# Patient Record
Sex: FEMALE | Race: WHITE
[De-identification: names, ages, dates, MRNs, and addresses within clinical notes are randomized per-mention and may not be internally consistent; named-entity substitution may affect disease eponyms.]

---

## 2017-09-17 ENCOUNTER — HEALTH MAINTENANCE LETTER (OUTPATIENT)
Age: 57
End: 2017-09-17

## 2019-02-06 ENCOUNTER — TELEPHONE (OUTPATIENT)
Dept: FAMILY MEDICINE CLINIC | Age: 59
End: 2019-02-06

## 2019-02-06 ENCOUNTER — OFFICE VISIT (OUTPATIENT)
Dept: FAMILY MEDICINE CLINIC | Age: 59
End: 2019-02-06

## 2019-02-06 VITALS
TEMPERATURE: 98.5 F | SYSTOLIC BLOOD PRESSURE: 120 MMHG | WEIGHT: 155 LBS | BODY MASS INDEX: 25.83 KG/M2 | DIASTOLIC BLOOD PRESSURE: 80 MMHG | HEART RATE: 76 BPM | OXYGEN SATURATION: 98 % | HEIGHT: 65 IN | RESPIRATION RATE: 20 BRPM

## 2019-02-06 DIAGNOSIS — J30.1 SEASONAL ALLERGIC RHINITIS DUE TO POLLEN: ICD-10-CM

## 2019-02-06 DIAGNOSIS — M54.2 CHRONIC NECK PAIN: ICD-10-CM

## 2019-02-06 DIAGNOSIS — H10.13 ALLERGIC CONJUNCTIVITIS OF BOTH EYES: ICD-10-CM

## 2019-02-06 DIAGNOSIS — E03.9 ACQUIRED HYPOTHYROIDISM: ICD-10-CM

## 2019-02-06 DIAGNOSIS — G89.29 CHRONIC NECK PAIN: ICD-10-CM

## 2019-02-06 DIAGNOSIS — F51.01 PRIMARY INSOMNIA: Primary | ICD-10-CM

## 2019-02-06 DIAGNOSIS — K21.9 GASTROESOPHAGEAL REFLUX DISEASE WITHOUT ESOPHAGITIS: ICD-10-CM

## 2019-02-06 DIAGNOSIS — G43.109 MIGRAINE WITH AURA AND WITHOUT STATUS MIGRAINOSUS, NOT INTRACTABLE: ICD-10-CM

## 2019-02-06 RX ORDER — LEVOTHYROXINE SODIUM 88 UG/1
88 TABLET ORAL
Qty: 90 TAB | Refills: 3 | Status: SHIPPED | OUTPATIENT
Start: 2019-02-06 | End: 2019-08-21 | Stop reason: SDUPTHER

## 2019-02-06 RX ORDER — RANITIDINE 300 MG/1
300 TABLET ORAL DAILY
Qty: 90 TAB | Refills: 0 | Status: SHIPPED | OUTPATIENT
Start: 2019-02-06 | End: 2019-05-06 | Stop reason: SDUPTHER

## 2019-02-06 RX ORDER — LEVOTHYROXINE SODIUM 88 UG/1
TABLET ORAL
COMMUNITY
End: 2019-02-06 | Stop reason: SDUPTHER

## 2019-02-06 RX ORDER — ZOLPIDEM TARTRATE 5 MG/1
5 TABLET ORAL
Qty: 20 TAB | Refills: 5 | Status: SHIPPED | OUTPATIENT
Start: 2019-02-06 | End: 2019-08-21 | Stop reason: SDUPTHER

## 2019-02-06 RX ORDER — PSEUDOEPHEDRINE HCL 30 MG
TABLET ORAL
COMMUNITY

## 2019-02-06 RX ORDER — TRAZODONE HYDROCHLORIDE 150 MG/1
150 TABLET ORAL
COMMUNITY
End: 2019-02-06 | Stop reason: SDUPTHER

## 2019-02-06 RX ORDER — RIZATRIPTAN BENZOATE 10 MG/1
10 TABLET ORAL
COMMUNITY
End: 2022-02-28 | Stop reason: DRUGHIGH

## 2019-02-06 RX ORDER — TRAZODONE HYDROCHLORIDE 150 MG/1
150 TABLET ORAL
Qty: 90 TAB | Refills: 3 | Status: SHIPPED | OUTPATIENT
Start: 2019-02-06 | End: 2019-07-20 | Stop reason: SDUPTHER

## 2019-02-06 RX ORDER — NEOMYCIN SULFATE, POLYMYXIN B SULFATE AND HYDROCORTISONE 10; 3.5; 1 MG/ML; MG/ML; [USP'U]/ML
3 SUSPENSION/ DROPS AURICULAR (OTIC) 4 TIMES DAILY
COMMUNITY
End: 2019-02-06 | Stop reason: ALTCHOICE

## 2019-02-06 RX ORDER — PANTOPRAZOLE SODIUM 40 MG/1
40 TABLET, DELAYED RELEASE ORAL DAILY
COMMUNITY
End: 2019-02-06 | Stop reason: SDUPTHER

## 2019-02-06 RX ORDER — BENZONATATE 100 MG/1
100 CAPSULE ORAL
COMMUNITY
End: 2019-02-06 | Stop reason: ALTCHOICE

## 2019-02-06 RX ORDER — SUMATRIPTAN 6 MG/.5ML
6 INJECTION, SOLUTION SUBCUTANEOUS ONCE
COMMUNITY
End: 2022-02-28 | Stop reason: SDUPTHER

## 2019-02-06 RX ORDER — PANTOPRAZOLE SODIUM 40 MG/1
40 TABLET, DELAYED RELEASE ORAL DAILY
Qty: 90 TAB | Refills: 3 | Status: SHIPPED | OUTPATIENT
Start: 2019-02-06 | End: 2020-09-14 | Stop reason: SDUPTHER

## 2019-02-06 RX ORDER — GUAIFENESIN, PSEUDOEPHEDRINE HYDROCHLORIDE 600; 60 MG/1; MG/1
1 TABLET, EXTENDED RELEASE ORAL EVERY 12 HOURS
COMMUNITY

## 2019-02-06 RX ORDER — MECLIZINE HYDROCHLORIDE 25 MG/1
TABLET ORAL
COMMUNITY

## 2019-02-06 RX ORDER — BENZOCAINE .13; .15; .5; 2 G/100G; G/100G; G/100G; G/100G
2 GEL ORAL DAILY
COMMUNITY
Start: 2019-02-06 | End: 2019-05-07 | Stop reason: ALTCHOICE

## 2019-02-06 RX ORDER — FLUTICASONE PROPIONATE AND SALMETEROL 250; 50 UG/1; UG/1
1 POWDER RESPIRATORY (INHALATION) EVERY 12 HOURS
COMMUNITY
End: 2019-02-06 | Stop reason: ALTCHOICE

## 2019-02-06 RX ORDER — AZELASTINE HYDROCHLORIDE 0.5 MG/ML
1 SOLUTION/ DROPS OPHTHALMIC 2 TIMES DAILY
Qty: 6 ML | Refills: 3 | Status: SHIPPED | OUTPATIENT
Start: 2019-02-06 | End: 2019-08-21 | Stop reason: SDUPTHER

## 2019-02-06 RX ORDER — HYDROCODONE BITARTRATE AND ACETAMINOPHEN 5; 325 MG/1; MG/1
TABLET ORAL
COMMUNITY
End: 2020-03-04

## 2019-02-06 RX ORDER — ZOLPIDEM TARTRATE 5 MG/1
TABLET ORAL
COMMUNITY
End: 2019-02-06 | Stop reason: SDUPTHER

## 2019-02-06 RX ORDER — ESTRADIOL AND NORETHINDRONE ACETATE .5; .1 MG/1; MG/1
1 TABLET ORAL DAILY
COMMUNITY

## 2019-02-06 NOTE — PROGRESS NOTES
Assessment/Plan:   
Diagnoses and all orders for this visit: 1. Primary insomnia- UDS will be done through pain management/Dr. Desire Aden as it isn't covered through insurance. Contract signed. [facility] reviewed and is in accordance with patient's prescriptions  
-     zolpidem (AMBIEN) 5 mg tablet; Take 1 Tab by mouth nightly as needed for Sleep. Max Daily Amount: 5 mg. 
-     traZODone (DESYREL) 150 mg tablet; Take 1 Tab by mouth nightly. 2. Migraine with aura and without status migrainosus, not intractable- on aimovig/triptans. Managed by neuro. -     traZODone (DESYREL) 150 mg tablet; Take 1 Tab by mouth nightly. 3. Chronic neck pain 4. Acquired hypothyroidism- 11/2018 labs good. Cont current. -     levothyroxine (SYNTHROID) 88 mcg tablet; Take 1 Tab by mouth Daily (before breakfast). 5. Gastroesophageal reflux disease without esophagitis- failed prilosec. Add zantac. F/u if not controlled and will try aciphex. 
-     raNITIdine (ZANTAC) 300 mg tab; Take 1 Tab by mouth daily. -     pantoprazole (PROTONIX) 40 mg tablet; Take 1 Tab by mouth daily. 6. Seasonal allergic rhinitis due to pollen- trial rhinocort. 7. Allergic conjunctivitis of both eyes- may also be dry eyes due to sudafed. -     azelastine (OPTIVAR) 0.05 % ophthalmic solution; Administer 1 Drop to both eyes two (2) times a day. Use in affected eye(s) The plan was discussed with the patient. The patient verbalized understanding and is in agreement with the plan. All medication potential side effects were discussed with the patient. Health Maintenance:  Try to get colo from GI on [facility] Topic Date Due  
 COLONOSCOPY  09/22/1978  Shingrix Vaccine Age 50> (1 of 2) 09/22/2010  BREAST CANCER SCRN MAMMOGRAM  11/05/2020  PAP AKA CERVICAL CYTOLOGY  09/01/2021  
 DTaP/Tdap/Td series (3 - Td) 12/14/2027  Hepatitis C Screening  Completed  Influenza Age 5 to Adult  Completed Severiano Garrison is a 62 y.o.  female and presents with Establish Care Subjective: 
Pt is here to establish care. Migraine w/ aura- on aimovig, maxalt and imitrex injection. Has failed topamax, elavil. Has been managed by neuro, Demetris Atkins. /Dr. Tata Cotton. Primary Insomnia - on ambien and trazodone. Trazodone was originally prescribed by neuro for migraines. She takes Charleen Haresh on weekdays. Chronic pain - on hydrocodone 7.5/325 #120/90days per pain management. Hypothyroid- on synthroid. TSH 11/2018 good. On HRT- managed by gyn. Reflux - on protonix. Not effectively controlling her sx over past year or so. Takes tums. Tries to watch her diet. Has failed prilosec. She isn't sure if she's tried nexium or aciphex. h pylori testing was neg 2016. Endorses dysphagia. Has had endoscopy previously, but doesn't remember who did it- both were reportedly negative. Had colo- age 48, reportedly neg but doesn't remember who. 
 
allergies -has issues most seasons except winter. Uses sudafed, sinus rinses, tylenol sinus. Can't remember to take nasal sprays. She c/o dry eyes x 1 year. Mild eye itching. Eyes are watery. No blurred vision. Uses visine w/ only partial relief ROS: 
Constitutional: No recent weight change. No weakness/fatigue. No f/c. Skin: No rashes, change in nails/hair, itching HENT: No HA, dizziness. No hearing loss/tinnitus. No nasal congestion/discharge. Eyes: No change in vision, double/blurred vision or eye pain/redness. Cardiovascular: No CP/palpitations. No WILSON/orthopnea/PND. Respiratory: No cough/sputum, dyspnea, wheezing. Gastointestinal: No dysphagia,+ reflux. No n/v. No constipation/diarrhea. No melena/rectal bleeding. Genitourinary: No dysuria, urinary hesitancy, nocturia, hematuria. No incontinence. Musculoskeletal: No joint pain/stiffness. No muscle pain/tenderness. Endo: No heat/cold intolerance, no polyuria/polydypsia. Heme: No h/o anemia. No easy bleeding/bruising. Allergy/Immunology: + seasonal rhinitis. Denies frequent colds, sinus/ear infections. Neurological: No seizures/numbness/weakness. No paresthesias. Psychiatric:  No depression, anxiety. PMH: 
Past Medical History:  
Diagnosis Date  Allergy   
 seasonal  
 GERD (gastroesophageal reflux disease)  Insomnia  Migraine  Thyroid disease  Vertigo PSH: 
Past Surgical History:  
Procedure Laterality Date  HX ABDOMINOPLASTY  HX GYN    
 BTL  
  
 
SH: 
Social History Tobacco Use  Smoking status: Never Smoker  Smokeless tobacco: Never Used Substance Use Topics  Alcohol use: Yes Alcohol/week: 0.6 oz Types: 1 Glasses of wine per week Frequency: 4 or more times a week Drinks per session: 1 or 2 Binge frequency: Never  Drug use: No  
 
 
FH: 
Family History Problem Relation Age of Onset  Heart Attack Father  Other Father Medications/Allergies: 
 
Current Outpatient Medications:  
  pantoprazole (PROTONIX) 40 mg tablet, Take 40 mg by mouth daily. , Disp: , Rfl:  
  levothyroxine (SYNTHROID) 88 mcg tablet, Take  by mouth Daily (before breakfast). , Disp: , Rfl:  
  zolpidem (AMBIEN) 5 mg tablet, Take  by mouth nightly as needed for Sleep., Disp: , Rfl:  
  traZODone (DESYREL) 150 mg tablet, Take 150 mg by mouth nightly., Disp: , Rfl:  
  pseudoephedrine (SUDAFED) 30 mg tablet, Take  by mouth every four (4) hours as needed for Congestion. , Disp: , Rfl:  
  meclizine (ANTIVERT) 25 mg tablet, Take  by mouth three (3) times daily as needed. , Disp: , Rfl:  
  estradiol-norethindrone (ACTIVELLA) 0.5-0.1 mg per tablet, Take 1 Tab by mouth daily. , Disp: , Rfl:  
  SUMAtriptan (IMITREX) 6 mg/0.5 mL injection, 6 mg by SubCUTAneous route once., Disp: , Rfl:  
  rizatriptan (MAXALT) 10 mg tablet, Take 10 mg by mouth once as needed for Migraine.  May repeat in 2 hours if needed, Disp: , Rfl:  
   HYDROcodone-acetaminophen (NORCO) 5-325 mg per tablet, Take  by mouth., Disp: , Rfl:  
  erenumab-aooe (AIMOVIG AUTOINJECTOR SC), by SubCUTAneous route., Disp: , Rfl:  
  PSEUDOEPHEDRINE-guaiFENesin (MUCINEX D)  mg per tablet, Take 1 Tab by mouth every twelve (12) hours. , Disp: , Rfl:  
Allergies Allergen Reactions  Nsaids (Non-Steroidal Anti-Inflammatory Drug) Other (comments) Causes acid reflux  Topamax [Topiramate] Other (comments) Confusion Objective: 
Visit Vitals /80 (BP 1 Location: Left arm, BP Patient Position: Sitting) Pulse 76 Temp 98.5 °F (36.9 °C) (Oral) Resp 20 Ht 5' 5\" (1.651 m) Wt 155 lb (70.3 kg) SpO2 98% BMI 25.79 kg/m² Constitutional: Well developed, nourished, no distress, alert HENT: Exterior ears and tympanic membranes normal bilaterally. Supple neck. No thyromegaly or lymphadenopathy. Oropharynx clear and moist mucous membranes. Eyes: Conjunctiva normal. PERRL. Cardiovascular: S1, S2.  RRR. No murmurs/rubs. No thrills palpated. No carotid bruits. Intact distal pulses. No edema. Pulmonary/Chest Wall: No abnormalities on inspection. Clear to auscultation bilaterally. No wheezing/rhonchi. Normal effort. GI: Soft, nontender, nondistended. Normal active bowel sounds. No  masses on palpation. No hepatosplenomegaly. Musculoskeletal: Gait normal.  Joints without deformity/tenderness. Neurological: Appropriate. No focal motor or sensory deficits. Speech normal.  
Skin: No lesions/rashes on inspection. Psych: Appropriate affect, judgement and insight. Short-term memory intact.

## 2019-02-06 NOTE — PROGRESS NOTES
Malcom Oppenheim is a 62 y.o. female (: 1960) presenting to address: Chief Complaint Patient presents with [place] Establish Care Vitals:  
 19 1250 BP: 120/80 Pulse: 76 Resp: 20 Temp: 98.5 °F (36.9 °C) TempSrc: Oral  
SpO2: 98% Weight: 155 lb (70.3 kg) Height: 5' 5\" (1.651 m) PainSc:   3 PainLoc: Head Hearing/Vision:  
 
 Visual Acuity Screening Right eye Left eye Both eyes Without correction: 20/40 20/30 20/20 With correction:     
 
 
Learning Assessment:  
 
Learning Assessment 2019 PRIMARY LEARNER Patient HIGHEST LEVEL OF EDUCATION - PRIMARY LEARNER  > 4 YEARS OF COLLEGE  
BARRIERS PRIMARY LEARNER NONE  
CO-LEARNER CAREGIVER No  
PRIMARY LANGUAGE ENGLISH  NEED No  
LEARNER PREFERENCE PRIMARY READING  
LEARNING SPECIAL TOPICS none ANSWERED BY patient RELATIONSHIP SELF  
ASSESSMENT COMMENT n/a Depression Screening: PHQ over the last two weeks 2019 PHQ Not Done Medical Reason (indicate in comments) Little interest or pleasure in doing things Not at all Feeling down, depressed, irritable, or hopeless Not at all Total Score PHQ 2 0 Fall Risk Assessment:  
 
Fall Risk Assessment, last 12 mths 2019 Able to walk? Yes Fall in past 12 months? No  
 
Abuse Screening:  
 
Abuse Screening Questionnaire 2019 Do you ever feel afraid of your partner? [id number] Are you in a relationship with someone who physically or mentally threatens you? 3TEN8 Is it safe for you to go home? Seamus Duke Advanced Directive: 1. Do you have an Advanced Directive? NO 
 
2. Would you like information on Advanced Directives?  YES

## 2019-05-06 DIAGNOSIS — K21.9 GASTROESOPHAGEAL REFLUX DISEASE WITHOUT ESOPHAGITIS: ICD-10-CM

## 2019-05-06 RX ORDER — RANITIDINE 300 MG/1
TABLET ORAL
Qty: 90 TAB | Refills: 0 | Status: SHIPPED | OUTPATIENT
Start: 2019-05-06 | End: 2019-07-20 | Stop reason: SDUPTHER

## 2019-05-07 RX ORDER — FLUTICASONE PROPIONATE 50 MCG
2 SPRAY, SUSPENSION (ML) NASAL DAILY
Qty: 3 BOTTLE | Refills: 3 | Status: SHIPPED | OUTPATIENT
Start: 2019-05-07 | End: 2019-08-21 | Stop reason: SINTOL

## 2019-07-20 DIAGNOSIS — G43.109 MIGRAINE WITH AURA AND WITHOUT STATUS MIGRAINOSUS, NOT INTRACTABLE: ICD-10-CM

## 2019-07-20 DIAGNOSIS — F51.01 PRIMARY INSOMNIA: ICD-10-CM

## 2019-07-22 RX ORDER — TRAZODONE HYDROCHLORIDE 150 MG/1
150 TABLET ORAL
Qty: 90 TAB | Refills: 3 | Status: SHIPPED | OUTPATIENT
Start: 2019-07-22 | End: 2020-09-14 | Stop reason: ALTCHOICE

## 2019-08-19 ENCOUNTER — HOSPITAL ENCOUNTER (OUTPATIENT)
Dept: LAB | Age: 59
Discharge: HOME | End: 2019-08-19
Payer: COMMERCIAL

## 2019-08-19 DIAGNOSIS — E03.9 ACQUIRED HYPOTHYROIDISM: Primary | ICD-10-CM

## 2019-08-19 DIAGNOSIS — Z00.00 ROUTINE GENERAL MEDICAL EXAMINATION AT A HEALTH CARE FACILITY: ICD-10-CM

## 2019-08-19 DIAGNOSIS — E03.9 ACQUIRED HYPOTHYROIDISM: ICD-10-CM

## 2019-08-19 LAB
ALBUMIN SERPL-MCNC: 4.3 G/DL (ref 3.4–5)
ALBUMIN/GLOB SERPL: 1.5 {RATIO} (ref 0.8–1.7)
ALP SERPL-CCNC: 54 U/L (ref 45–117)
ALT SERPL-CCNC: 25 U/L (ref 13–56)
ANION GAP SERPL CALC-SCNC: 7 MMOL/L (ref 3–18)
AST SERPL-CCNC: 20 U/L (ref 10–38)
BILIRUB SERPL-MCNC: 0.4 MG/DL (ref 0.2–1)
BUN SERPL-MCNC: 25 MG/DL (ref 7–18)
BUN/CREAT SERPL: 27 (ref 12–20)
CALCIUM SERPL-MCNC: 8.5 MG/DL (ref 8.5–10.1)
CHLORIDE SERPL-SCNC: 107 MMOL/L (ref 100–111)
CHOLEST SERPL-MCNC: 238 MG/DL
CO2 SERPL-SCNC: 27 MMOL/L (ref 21–32)
CREAT SERPL-MCNC: 0.93 MG/DL (ref 0.6–1.3)
ERYTHROCYTE [DISTWIDTH] IN BLOOD BY AUTOMATED COUNT: 13.6 % (ref 11.6–14.5)
GLOBULIN SER CALC-MCNC: 2.9 G/DL (ref 2–4)
GLUCOSE SERPL-MCNC: 96 MG/DL (ref 74–99)
HCT VFR BLD AUTO: 40.9 % (ref 35–45)
HDLC SERPL-MCNC: 60 MG/DL (ref 40–60)
HDLC SERPL: 4 {RATIO} (ref 0–5)
HGB BLD-MCNC: 13.3 G/DL (ref 12–16)
LDLC SERPL CALC-MCNC: 144.8 MG/DL (ref 0–100)
LIPID PROFILE,FLP: ABNORMAL
MCH RBC QN AUTO: 31.4 PG (ref 24–34)
MCHC RBC AUTO-ENTMCNC: 32.5 G/DL (ref 31–37)
MCV RBC AUTO: 96.7 FL (ref 74–97)
PLATELET # BLD AUTO: 278 K/UL (ref 135–420)
PMV BLD AUTO: 10.8 FL (ref 9.2–11.8)
POTASSIUM SERPL-SCNC: 4.3 MMOL/L (ref 3.5–5.5)
PROT SERPL-MCNC: 7.2 G/DL (ref 6.4–8.2)
RBC # BLD AUTO: 4.23 M/UL (ref 4.2–5.3)
SODIUM SERPL-SCNC: 141 MMOL/L (ref 136–145)
TRIGL SERPL-MCNC: 166 MG/DL (ref ?–150)
TSH SERPL DL<=0.05 MIU/L-ACNC: 1.2 UIU/ML (ref 0.36–3.74)
VLDLC SERPL CALC-MCNC: 33.2 MG/DL
WBC # BLD AUTO: 5.4 K/UL (ref 4.6–13.2)

## 2019-08-19 PROCEDURE — 85027 COMPLETE CBC AUTOMATED: CPT

## 2019-08-19 PROCEDURE — 84443 ASSAY THYROID STIM HORMONE: CPT

## 2019-08-19 PROCEDURE — 80061 LIPID PANEL: CPT

## 2019-08-19 PROCEDURE — 80053 COMPREHEN METABOLIC PANEL: CPT

## 2019-08-19 PROCEDURE — 36415 COLL VENOUS BLD VENIPUNCTURE: CPT

## 2019-08-21 ENCOUNTER — OFFICE VISIT (OUTPATIENT)
Dept: FAMILY MEDICINE CLINIC | Age: 59
End: 2019-08-21

## 2019-08-21 ENCOUNTER — TELEPHONE (OUTPATIENT)
Dept: FAMILY MEDICINE CLINIC | Age: 59
End: 2019-08-21

## 2019-08-21 VITALS
OXYGEN SATURATION: 98 % | RESPIRATION RATE: 20 BRPM | BODY MASS INDEX: 27.49 KG/M2 | DIASTOLIC BLOOD PRESSURE: 75 MMHG | HEART RATE: 73 BPM | HEIGHT: 65 IN | SYSTOLIC BLOOD PRESSURE: 120 MMHG | WEIGHT: 165 LBS | TEMPERATURE: 97.8 F

## 2019-08-21 DIAGNOSIS — E03.9 ACQUIRED HYPOTHYROIDISM: ICD-10-CM

## 2019-08-21 DIAGNOSIS — E78.00 PURE HYPERCHOLESTEROLEMIA: ICD-10-CM

## 2019-08-21 DIAGNOSIS — Z00.00 ROUTINE GENERAL MEDICAL EXAMINATION AT A HEALTH CARE FACILITY: Primary | ICD-10-CM

## 2019-08-21 DIAGNOSIS — H10.13 ALLERGIC CONJUNCTIVITIS OF BOTH EYES: ICD-10-CM

## 2019-08-21 DIAGNOSIS — F51.01 PRIMARY INSOMNIA: ICD-10-CM

## 2019-08-21 DIAGNOSIS — Z23 ENCOUNTER FOR IMMUNIZATION: ICD-10-CM

## 2019-08-21 RX ORDER — ZOLPIDEM TARTRATE 5 MG/1
5 TABLET ORAL
Qty: 20 TAB | Refills: 5 | Status: SHIPPED | OUTPATIENT
Start: 2019-08-21 | End: 2020-02-06 | Stop reason: SDUPTHER

## 2019-08-21 RX ORDER — AZELASTINE HYDROCHLORIDE 0.5 MG/ML
1 SOLUTION/ DROPS OPHTHALMIC 2 TIMES DAILY
Qty: 6 ML | Refills: 3 | Status: SHIPPED | OUTPATIENT
Start: 2019-08-21 | End: 2019-09-17 | Stop reason: SDUPTHER

## 2019-08-21 RX ORDER — SERTRALINE HYDROCHLORIDE 25 MG/1
TABLET, FILM COATED ORAL DAILY
COMMUNITY
End: 2020-09-14 | Stop reason: SDUPTHER

## 2019-08-21 RX ORDER — LEVOTHYROXINE SODIUM 88 UG/1
88 TABLET ORAL
Qty: 90 TAB | Refills: 3 | Status: SHIPPED | OUTPATIENT
Start: 2019-08-21 | End: 2020-09-14 | Stop reason: SDUPTHER

## 2019-08-21 NOTE — PROGRESS NOTES
Assessment/Plan:    1. Routine general medical examination at a health care facility  -labs good    2. Primary insomnia  -refilled  - zolpidem (AMBIEN) 5 mg tablet; Take 1 Tab by mouth nightly as needed for Sleep. Max Daily Amount: 5 mg. Dispense: 20 Tab; Refill: 5    3. Pure hypercholesterolemia  -watch diet    4. Allergic conjunctivitis of both eyes  -refilled  - azelastine (OPTIVAR) 0.05 % ophthalmic solution; Administer 1 Drop to both eyes two (2) times a day. Use in affected eye(s)  Dispense: 6 mL; Refill: 3    5. Acquired hypothyroidism  -refilled  - levothyroxine (SYNTHROID) 88 mcg tablet; Take 1 Tab by mouth Daily (before breakfast). Dispense: 90 Tab; Refill: 3    6. Encounter for immunization  - ZOSTER VACC RECOMBINANT ADJUVANTED  - VT IMMUNIZ ADMIN,1 SINGLE/COMB VAC/TOXOID      The plan was discussed with the patient. The patient verbalized understanding and is in agreement with the plan. All medication potential side effects were discussed with the patient. Health Maintenance:   Health Maintenance   Topic Date Due    Shingrix Vaccine Age 49> (1 of 2) 09/22/2010    Influenza Age 5 to Adult  08/01/2019    BREAST CANCER SCRN MAMMOGRAM  11/05/2020    COLONOSCOPY  01/24/2021    PAP AKA CERVICAL CYTOLOGY  09/01/2021    DTaP/Tdap/Td series (3 - Td) 12/14/2027    Hepatitis C Screening  Completed    Pneumococcal 0-64 years  Aged Out       David Littlejohn is a 62 y.o. female and presents with Physical     Subjective:  Pt c/o \"arthritis\". States she feels stiff in her hips and back. Am stiffness and gelling lasts only seconds. No redness/swelling/warmth of joints. Has tried glucosamine which helps, but it makes her gerd worse. Hypothyroid- labs were good. Insomnia - on ambien. UDS done through neuro. ROS:  Constitutional: No recent weight change. No weakness/fatigue. No f/c. Skin: No rashes, change in nails/hair, itching   HENT: No HA, dizziness. No hearing loss/tinnitus.   No nasal congestion/discharge. Eyes: No change in vision, double/blurred vision or eye pain/redness. Cardiovascular: No CP/palpitations. No WILSON/orthopnea/PND. Respiratory: No cough/sputum, dyspnea, wheezing. Gastointestinal: No dysphagia, reflux. No n/v. No constipation/diarrhea. No melena/rectal bleeding. Genitourinary: No dysuria, urinary hesitancy, nocturia, hematuria. No incontinence. Musculoskeletal: No joint pain/stiffness. No muscle pain/tenderness. Endo: No heat/cold intolerance, no polyuria/polydypsia. Heme: No h/o anemia. No easy bleeding/bruising. Allergy/Immunology: No seasonal rhinitis. Denies frequent colds, sinus/ear infections. Neurological: No seizures/numbness/weakness. No paresthesias. Psychiatric:  No depression, anxiety. The problem list was updated as a part of today's visit. Patient Active Problem List   Diagnosis Code    Migraine with aura and without status migrainosus, not intractable G43. 109    Seasonal allergic rhinitis due to pollen J30.1    Primary insomnia F51.01    Chronic neck pain M54.2, G89.29    Acquired hypothyroidism E03.9    Gastroesophageal reflux disease without esophagitis K21.9       The PSH, FH were reviewed. SH:  Social History     Tobacco Use    Smoking status: Never Smoker    Smokeless tobacco: Never Used   Substance Use Topics    Alcohol use: Yes     Alcohol/week: 1.0 standard drinks     Types: 1 Glasses of wine per week     Frequency: 4 or more times a week     Drinks per session: 1 or 2     Binge frequency: Never    Drug use: No       Medications/Allergies:  Current Outpatient Medications on File Prior to Visit   Medication Sig Dispense Refill    raNITIdine (ZANTAC) 300 mg tab TAKE 1 TABLET BY MOUTH EVERY DAY 90 Tab 3    traZODone (DESYREL) 150 mg tablet Take 1 Tab by mouth nightly. 90 Tab 3    fluticasone propionate (FLONASE) 50 mcg/actuation nasal spray 2 Sprays by Both Nostrils route daily.  3 Bottle 3    pseudoephedrine (SUDAFED) 30 mg tablet Take  by mouth every four (4) hours as needed for Congestion.  meclizine (ANTIVERT) 25 mg tablet Take  by mouth three (3) times daily as needed.  estradiol-norethindrone (ACTIVELLA) 0.5-0.1 mg per tablet Take 1 Tab by mouth daily.  SUMAtriptan (IMITREX) 6 mg/0.5 mL injection 6 mg by SubCUTAneous route once.  rizatriptan (MAXALT) 10 mg tablet Take 10 mg by mouth once as needed for Migraine. May repeat in 2 hours if needed      HYDROcodone-acetaminophen (NORCO) 5-325 mg per tablet Take  by mouth.  erenumab-aooe (AIMOVIG AUTOINJECTOR SC) by SubCUTAneous route.  PSEUDOEPHEDRINE-guaiFENesin (MUCINEX D)  mg per tablet Take 1 Tab by mouth every twelve (12) hours.  pantoprazole (PROTONIX) 40 mg tablet Take 1 Tab by mouth daily. 90 Tab 3    levothyroxine (SYNTHROID) 88 mcg tablet Take 1 Tab by mouth Daily (before breakfast). 90 Tab 3    zolpidem (AMBIEN) 5 mg tablet Take 1 Tab by mouth nightly as needed for Sleep. Max Daily Amount: 5 mg. 20 Tab 5    azelastine (OPTIVAR) 0.05 % ophthalmic solution Administer 1 Drop to both eyes two (2) times a day. Use in affected eye(s) 6 mL 3     No current facility-administered medications on file prior to visit. Allergies   Allergen Reactions    Nsaids (Non-Steroidal Anti-Inflammatory Drug) Other (comments)     Causes acid reflux    Topamax [Topiramate] Other (comments)     Confusion         Objective:  Visit Vitals  /75 (BP 1 Location: Left arm, BP Patient Position: Sitting)   Pulse 73   Temp 97.8 °F (36.6 °C) (Oral)   Resp 20   Ht 5' 5\" (1.651 m)   Wt 165 lb (74.8 kg)   SpO2 98%   BMI 27.46 kg/m²      Constitutional: Well developed, nourished, no distress, alert   HENT: Exterior ears and tympanic membranes normal bilaterally. Supple neck. No thyromegaly or lymphadenopathy. Oropharynx clear and moist mucous membranes. Normal inferior turbinates. Septum midline. Eyes: Conjunctiva normal. PERRL. Eyelids normal.   CV: S1, S2.  RRR. No murmurs/rubs. No edema. Pulm: No abnormalities on inspection. Clear to auscultation bilaterally. No wheezing/rhonchi. Normal effort. GI: Soft, nontender, nondistended. Normal active bowel sounds. MS: Gait normal.  Joints without deformity/tenderness. Strength intact bilateral upper and lower ext. Normal ROM all extremities. Neuro: A/O x 3. No focal motor or sensory deficits. Speech normal.   Skin: No lesions/rashes on inspection. Psych: Appropriate affect, judgement and insight. Short-term memory intact. Labwork and Ancillary Studies:    CBC w/Diff  Lab Results   Component Value Date/Time    WBC 5.4 08/19/2019 08:27 AM    HGB 13.3 08/19/2019 08:27 AM    PLATELET 165 32/00/1315 08:27 AM         Basic Metabolic Profile/LFTs  Lab Results   Component Value Date/Time    Sodium 141 08/19/2019 08:27 AM    Potassium 4.3 08/19/2019 08:27 AM    Chloride 107 08/19/2019 08:27 AM    CO2 27 08/19/2019 08:27 AM    Anion gap 7 08/19/2019 08:27 AM    Glucose 96 08/19/2019 08:27 AM    BUN 25 (H) 08/19/2019 08:27 AM    Creatinine 0.93 08/19/2019 08:27 AM    BUN/Creatinine ratio 27 (H) 08/19/2019 08:27 AM    GFR est AA >60 08/19/2019 08:27 AM    GFR est non-AA >60 08/19/2019 08:27 AM    Calcium 8.5 08/19/2019 08:27 AM      Lab Results   Component Value Date/Time    ALT (SGPT) 25 08/19/2019 08:27 AM    AST (SGOT) 20 08/19/2019 08:27 AM    Alk.  phosphatase 54 08/19/2019 08:27 AM    Bilirubin, total 0.4 08/19/2019 08:27 AM       Cholesterol  Lab Results   Component Value Date/Time    Cholesterol, total 238 (H) 08/19/2019 08:27 AM    HDL Cholesterol 60 08/19/2019 08:27 AM    LDL, calculated 144.8 (H) 08/19/2019 08:27 AM    Triglyceride 166 (H) 08/19/2019 08:27 AM    CHOL/HDL Ratio 4.0 08/19/2019 08:27 AM

## 2019-08-21 NOTE — PROGRESS NOTES
shingrix # 1 Immunization/s administered 8/21/2019 by Radha Phelan LPN with guardian's consent. Patient tolerated procedure well. No reactions noted.

## 2019-08-21 NOTE — PROGRESS NOTES
Aissatou Vegas is a 62 y.o. female (: 1960) presenting to address:    Chief Complaint   Patient presents with    Physical       Vitals:    19 1025   BP: 120/75   Pulse: 73   Resp: 20   Temp: 97.8 °F (36.6 °C)   TempSrc: Oral   SpO2: 98%   Weight: 165 lb (74.8 kg)   Height: 5' 5\" (1.651 m)   PainSc:   2   PainLoc: Generalized       Hearing/Vision:      Visual Acuity Screening    Right eye Left eye Both eyes   Without correction: 20/50 20/25 20/25   With correction:          Learning Assessment:     Learning Assessment 2019   PRIMARY LEARNER Patient   HIGHEST LEVEL OF EDUCATION - PRIMARY LEARNER  > 4 YEARS OF COLLEGE   BARRIERS PRIMARY LEARNER NONE   CO-LEARNER CAREGIVER No   PRIMARY LANGUAGE ENGLISH    NEED No   LEARNER PREFERENCE PRIMARY READING   LEARNING SPECIAL TOPICS none   ANSWERED BY patient   RELATIONSHIP SELF   ASSESSMENT COMMENT n/a     Depression Screening:     3 most recent PHQ Screens 2019   PHQ Not Done Active Diagnosis of Depression or Bipolar Disorder   Little interest or pleasure in doing things -   Feeling down, depressed, irritable, or hopeless -   Total Score PHQ 2 -     Fall Risk Assessment:     Fall Risk Assessment, last 12 mths 2019   Able to walk? Yes   Fall in past 12 months? Yes   Fall with injury? Yes   Number of falls in past 12 months 1   Fall Risk Score 2     Abuse Screening:     Abuse Screening Questionnaire 2019   Do you ever feel afraid of your partner? N   Are you in a relationship with someone who physically or mentally threatens you? N   Is it safe for you to go home? Y     Coordination of Care Questionaire:   1. Have you been to the ER, urgent care clinic since your last visit? Hospitalized since your last visit? NO    2. Have you seen or consulted any other health care providers outside of the [address] [place] [address] since your last visit? Include any pap smears or colon screening. YES- psychiatry    Advanced Directive:   1.  Do you have an Advanced Directive? NO    2. Would you like information on Advanced Directives?  YES

## 2019-08-21 NOTE — PATIENT INSTRUCTIONS
Vaccine Information Statement    Recombinant Zoster (Shingles) Vaccine, RZV: What you need to know     Many Vaccine Information Statements are available in Turkmen and other languages. See www.immunize.org/vis  Hojas de Información Sobre Vacunas están disponibles en Español y en muchos otros idiomas. Visite [email or web address]    1. Why get vaccinated? Shingles (also called herpes zoster, or just zoster) is a painful skin rash, often with blisters. Shingles is caused by the varicella zoster virus, the same virus that causes chickenpox. After you have chickenpox, the virus stays in your body and can cause shingles later in life. You cant catch shingles from another person. However, a person who has never had chickenpox (or chickenpox vaccine) could get chickenpox from someone with shingles. A shingles rash usually appears on one side of the face or body and heals within 2 to 4 weeks. Its main symptom is pain, which can be severe. Other symptoms can include fever, headache, chills, and upset stomach. Very rarely, a shingles infection can lead to pneumonia, hearing problems, blindness, brain inflammation (encephalitis), or death. For about 1 person in 5, severe pain can continue even long after the rash has cleared up. This long-lasting pain is called post-herpetic neuralgia (PHN). Shingles is far more common in people 48years of age and older than in younger people, and the risk increases with age. It is also more common in people whose immune system is weakened because of a disease such as cancer, or by drugs such as steroids or chemotherapy. At least 1 million people a year in the [facility] get shingles. 2. Shingles vaccine (recombinant)    Recombinant shingles vaccine was approved by FDA in 2017 for the prevention of shingles. In clinical trials, it was more than 90% effective in preventing shingles. It can also reduce the likelihood of PHN.     Two doses, 2 to 6 months apart, are recommended for adults 48 and older. This vaccine is also recommended for people who have already gotten the live shingles vaccine (Zostavax). There is no live virus in this vaccine. 3. Some people should not get this vaccine    Tell your vaccine provider if you:     Have any severe, life-threatening allergies. A person who has ever had a life-threatening allergic reaction after a dose of recombinant shingles vaccine, or has a severe allergy to any component of this vaccine, may be advised not to be vaccinated. Ask your health care provider if you want information about vaccine components.  Are pregnant or breastfeeding. There is not much information about use of recombinant shingles vaccine in pregnant or nursing women. Your healthcare provider might recommend delaying vaccination.  Are not feeling well. If you have a mild illness, such as a cold, you can probably get the vaccine today. If you are moderately or severely ill, you should probably wait until you recover. Your doctor can advise you. 4. Risks of a vaccine reaction    With any medicine, including vaccines, there is a chance of reactions. After recombinant shingles vaccination, a person might experience:    Pain, redness, soreness, or swelling at the site of the injection   Headache, muscle aches, fever, shivering, fatigue    In clinical trials, most people got a sore arm with mild or moderate pain after vaccination, and some also had redness and swelling where they got the shot. Some people felt tired, had muscle pain, a headache, shivering, fever, stomach pain, or nausea. About 1 out of 6 people who got recombinant zoster vaccine experienced side effects that prevented them from doing regular activities. Symptoms went away on their own in about 2 to 3 days. Side effects were more common in younger people.     You should still get the second dose of recombinant zoster vaccine even if you had one of these reactions after the first dose. Other things that could happen after this vaccine:     People sometimes faint after medical procedures, including vaccination. Sitting or lying down for about 15 minutes can help prevent fainting and injuries caused by a fall. Tell your provider if you feel dizzy or have vision changes or ringing in the ears.  Some people get shoulder pain that can be more severe and longer-lasting than routine soreness that can follow injections. This happens very rarely.  Any medication can cause a severe allergic reaction. Such reactions to a vaccine are estimated at about 1 in a million doses, and would happen within a few minutes to a few hours after the vaccination. As with any medicine, there is a very remote chance of a vaccine causing a serious injury or death. The safety of vaccines is always being monitored. For more information, visit: www.cdc.gov/vaccinesafety/     5. What if there is a serious problem? What should I look for?  Look for anything that concerns you, such as signs of a severe allergic reaction, very high fever, or unusual behavior. Signs of a severe allergic reaction can include hives, swelling of the face and throat, difficulty breathing, a fast heartbeat, dizziness, and weakness. These would usually start a few minutes to a few hours after the vaccination. What should I do?  If you think it is a severe allergic reaction or other emergency that cant wait, call 9-1-1 or get to the nearest hospital. Otherwise, call your health care provider. Afterward, the reaction should be reported to the Vaccine Adverse Event Reporting System (VAERS). Your doctor should file this report, or you can do it yourself through the VAERS website at www.vaers. hhs.gov, or by calling 4-616.663.9280. VAERS does not give medical advice. 6. How can I learn more?  Ask your health care provider.  He or she can give you the vaccine package insert or suggest other sources of information.  Call your local or state health department.  Contact the Centers for Disease Control and Prevention (CDC):  - Call 4-618.310.9488 (1-800-CDC-INFO) or  - Visit CDCs website at www.cdc.gov/vaccines    Vaccine Information Statement  Recombinant Zoster Vaccine   2/12/2018    [facility] Disease Control and Prevention    Office Use Only       Hand Arthritis: Exercises  Introduction  Here are some examples of exercises for you to try. The exercises may be suggested for a condition or for rehabilitation. Start each exercise slowly. Ease off the exercises if you start to have pain. You will be told when to start these exercises and which ones will work best for you. How to do the exercises  Tendon glides    1. In this exercise, the steps follow one another to a make a continuous movement. 2. With your affected hand, point your fingers and thumb straight up. Your wrist should be relaxed, following the line of your fingers and thumb. 3. Curl your fingers so that the top two joints in them are bent, and your fingers wrap down. Your fingertips should touch or be near the base of your fingers. Your fingers will look like a hook. 4. Make a fist by bending your knuckles. Your thumb can gently rest against your index (pointing) finger. 5. Unwind your fingers slightly so that your fingertips can touch the base of your palm. Your thumb can rest against your index finger. 6. Move back to your starting position, with your fingers and thumb pointing up. 7. Repeat the series of motions 8 to 12 times. 8. Switch hands and repeat steps 1 through 6, even if only one hand is sore. Intrinsic flexion    1. Rest your affected hand on a table and bend the large joints where your fingers connect to your hand. Keep your thumb and the other joints in your fingers straight. 2. Slowly straighten your fingers.  Your wrist should be relaxed, following the line of your fingers and thumb.  3. Move back to your starting position, with your hand bent. 4. Repeat 8 to 12 times. 5. Switch hands and repeat steps 1 through 4, even if only one hand is sore. Finger extension    1. Place your affected hand flat on a table. 2. Lift and then lower one finger at a time off the table. 3. Repeat 8 to 12 times. 4. Switch hands and repeat steps 1 through 3, even if only one hand is sore. MP extension    1. Place your good hand on a table, palm up. Put your affected hand on top of your good hand with your fingers wrapped around the thumb of your good hand like you are making a fist.  2. Slowly uncurl the joints of your affected hand where your fingers connect to your hand so that only the top two joints of your fingers are bent. Your fingers will look like a hook. 3. Move back to your starting position, with your fingers wrapped around your good thumb. 4. Repeat 8 to 12 times. 5. Switch hands and repeat steps 1 through 4, even if only one hand is sore. PIP extension (with MP extension)    1. Place your good hand on a table, palm up. Put your affected hand on top of your good hand, palm up. 2. Use the thumb and fingers of your good hand to grasp below the middle joint of one finger of your affected hand. 3. Straighten the last two joints of that finger. 4. Repeat 8 to 12 times. 5. Repeat steps 1 through 4 with each finger. 6. Switch hands and repeat steps 1 through 5, even if only one hand is sore. DIP flexion    1. With your good hand, grasp one finger of your affected hand. Your thumb will be on the top side of your finger just below the joint that is closest to your fingernail. 2. Slowly bend your affected finger only at the joint closest to your fingernail. 3. Repeat 8 to 12 times. 4. Repeat steps 1 through 3 with each finger. 5. Switch hands and repeat steps 1 through 4, even if only one hand is sore. Follow-up care is a key part of your treatment and safety.  Be sure to make and go to all appointments, and call your doctor if you are having problems. It's also a good idea to know your test results and keep a list of the medicines you take. Where can you learn more? Go to http://neftali-jason.info/. Enter M258 in the search box to learn more about \"Hand Arthritis: Exercises. \"  Current as of: September 20, 2018  Content Version: 12.1  © 9478-4655 [employer]. Care instructions adapted under license by [employer] (which disclaims liability or warranty for this information). If you have questions about a medical condition or this instruction, always ask your healthcare professional. [address] any warranty or liability for your use of this information. Low Back Arthritis: Exercises  Introduction  Here are some examples of typical rehabilitation exercises for your condition. Start each exercise slowly. Ease off the exercise if you start to have pain. Your doctor or physical therapist will tell you when you can start these exercises and which ones will work best for you. When you are not being active, find a comfortable position for rest. Some people are comfortable on the floor or a medium-firm bed with a small pillow under their head and another under their knees. Some people prefer to lie on their side with a pillow between their knees. Don't stay in one position for too long. Take short walks (10 to 20 minutes) every 2 to 3 hours. Avoid slopes, hills, and stairs until you feel better. Walk only distances you can manage without pain, especially leg pain. How to do the exercises  Pelvic tilt    9. Lie on your back with your knees bent. 10. \"Brace\" your stomachtighten your muscles by pulling in and imagining your belly button moving toward your spine. 11. Press your lower back into the floor. You should feel your hips and pelvis rock back. 12. Hold for 6 seconds while breathing smoothly.   13. Relax and allow your pelvis and hips to rock forward. 14. Repeat 8 to 12 times. Back stretches    6. Get down on your hands and knees on the floor. 7. Relax your head and allow it to droop. Round your back up toward the ceiling until you feel a nice stretch in your upper, middle, and lower back. Hold this stretch for as long as it feels comfortable, or about 15 to 30 seconds. 8. Return to the starting position with a flat back while you are on your hands and knees. 9. Let your back sway by pressing your stomach toward the floor. Lift your buttocks toward the ceiling. 10. Hold this position for 15 to 30 seconds. 11. Repeat 2 to 4 times. Follow-up care is a key part of your treatment and safety. Be sure to make and go to all appointments, and call your doctor if you are having problems. It's also a good idea to know your test results and keep a list of the medicines you take. Where can you learn more? Go to http://neftali-jason.info/. Enter M858 in the search box to learn more about \"Low Back Arthritis: Exercises. \"  Current as of: September 20, 2018  Content Version: 12.1  © 7307-5367 [employer]. Care instructions adapted under license by [employer] (which disclaims liability or warranty for this information). If you have questions about a medical condition or this instruction, always ask your healthcare professional. [place] [address] any warranty or liability for your use of this information. Hip Arthritis: Exercises  Introduction  Here are some examples of exercises for you to try. The exercises may be suggested for a condition or for rehabilitation. Start each exercise slowly. Ease off the exercises if you start to have pain. You will be told when to start these exercises and which ones will work best for you. How to do the exercises  Straight-leg raises to the outside    15. Lie on your side, with your affected hip on top.   16. Tighten the front thigh muscles of your top leg to keep your knee straight. 16. Keep your hip and your leg straight in line with the rest of your body, and keep your knee pointing forward. Do not drop your hip back. 18. Lift your top leg straight up toward the ceiling, about 12 inches off the floor. Hold for about 6 seconds, then slowly lower your leg. 19. Repeat 8 to 12 times. 20. Switch legs and repeat steps 1 through 5, even if only one hip is sore. Straight-leg raises to the inside    12. Lie on your side with your affected hip on the floor. 13. You can either prop up your other leg on a chair, or you can bend that knee and put that foot in front of your other knee. Do not drop your hip back. 14. Tighten the muscles on the front thigh of your bottom leg to straighten that knee. 15. Keep your kneecap pointing forward and your leg straight, and lift your bottom leg up toward the ceiling about 6 inches. Hold for about 6 seconds, then lower slowly. 16. Repeat 8 to 12 times. 17. Switch legs and repeat steps 1 through 5, even if only one hip is sore. Hip hike    5. Stand sideways on the bottom step of a staircase, and hold on to the banister or wall. 6. Keeping both knees straight, lift your good leg off the step and let it hang down. Then hike your good hip up to the same level as your affected hip or a little higher. 7. Repeat 8 to 12 times. 8. Switch legs and repeat steps 1 through 3, even if only one hip is sore. Bridging    6. Lie on your back with both knees bent. Your knees should be bent about 90 degrees. 7. Then push your feet into the floor, squeeze your buttocks, and lift your hips off the floor until your shoulders, hips, and knees are all in a straight line. 8. Hold for about 6 seconds as you continue to breathe normally, and then slowly lower your hips back down to the floor and rest for up to 10 seconds. 9. Repeat 8 to 12 times. Hamstring stretch (lying down)    7.  Lie flat on your back with your legs straight. If you feel discomfort in your back, place a small towel roll under your lower back. 8. Holding the back of your affected leg, lift your leg straight up and toward your body until you feel a stretch at the back of your thigh. 9. Hold the stretch for at least 30 seconds. 10. Repeat 2 to 4 times. 11. Switch legs and repeat steps 1 through 4, even if only one hip is sore. Standing quadriceps stretch    6. If you are not steady on your feet, hold on to a chair, counter, or wall. You can also lie on your stomach or your side to do this exercise. 7. Bend the knee of the leg you want to stretch, and reach behind you to grab the front of your foot or ankle with the hand on the same side. For example, if you are stretching your right leg, use your right hand. 8. Keeping your knees next to each other, pull your foot toward your buttock until you feel a gentle stretch across the front of your hip and down the front of your thigh. Your knee should be pointed directly to the ground, and not out to the side. 9. Hold the stretch for at least 15 to 30 seconds. 10. Repeat 2 to 4 times. 11. Switch legs and repeat steps 1 through 5, even if only one hip is sore. Hip rotator stretch    1. Lie on your back with both knees bent and your feet flat on the floor. 2. Put the ankle of your affected leg on your opposite thigh near your knee. 3. Use your hand to gently push your knee away from your body until you feel a gentle stretch around your hip. 4. Hold the stretch for 15 to 30 seconds. 5. Repeat 2 to 4 times. 6. Repeat steps 1 through 5, but this time use your hand to gently pull your knee toward your opposite shoulder. 7. Switch legs and repeat steps 1 through 6, even if only one hip is sore. Knee-to-chest    1. Lie on your back with your knees bent and your feet flat on the floor.   2. Bring your affected leg to your chest, keeping the other foot flat on the floor (or keeping the other leg straight, whichever feels better on your lower back). 3. Keep your lower back pressed to the floor. Hold for at least 15 to 30 seconds. 4. Relax, and lower the knee to the starting position. 5. Repeat 2 to 4 times. 6. Switch legs and repeat steps 1 through 5, even if only one hip is sore. 7. To get more stretch, put your other leg flat on the floor while pulling your knee to your chest.    Clamshell    1. Lie on your side, with your affected hip on top. Keep your feet and knees together and your knees bent. 2. Raise your top knee, but keep your feet together. Do not let your hips roll back. Your legs should open up like a clamshell. 3. Hold for 6 seconds. 4. Slowly lower your knee back down. Rest for 10 seconds. 5. Repeat 8 to 12 times. 6. Switch legs and repeat steps 1 through 5, even if only one hip is sore. Follow-up care is a key part of your treatment and safety. Be sure to make and go to all appointments, and call your doctor if you are having problems. It's also a good idea to know your test results and keep a list of the medicines you take. Where can you learn more? Go to http://neftali-jason.info/. Enter K909 in the search box to learn more about \"Hip Arthritis: Exercises. \"  Current as of: September 20, 2018  Content Version: 12.1  © 9211-6418 [employer]. Care instructions adapted under license by [employer] (which disclaims liability or warranty for this information). If you have questions about a medical condition or this instruction, always ask your healthcare professional. [place] [address] any warranty or liability for your use of this information.

## 2019-08-26 NOTE — TELEPHONE ENCOUNTER
Please tell pt we spoke with Dr. Malcolm Mccallum office. She is overdue for colo and she needs to schedule.

## 2019-08-26 NOTE — TELEPHONE ENCOUNTER
Called pt, reached self identified vm. Left msg that we confirmed with 's office that she is overdue for colo and should schedule.

## 2019-09-16 DIAGNOSIS — H10.13 ALLERGIC CONJUNCTIVITIS OF BOTH EYES: ICD-10-CM

## 2019-09-17 RX ORDER — AZELASTINE HYDROCHLORIDE 0.5 MG/ML
1 SOLUTION/ DROPS OPHTHALMIC 2 TIMES DAILY
Qty: 6 ML | Refills: 3 | Status: SHIPPED | OUTPATIENT
Start: 2019-09-17 | End: 2020-01-27

## 2019-10-07 DIAGNOSIS — K21.9 GASTROESOPHAGEAL REFLUX DISEASE WITHOUT ESOPHAGITIS: ICD-10-CM

## 2019-10-07 RX ORDER — RANITIDINE 300 MG/1
300 TABLET ORAL
Qty: 90 TAB | Refills: 3 | Status: SHIPPED | OUTPATIENT
Start: 2019-10-07 | End: 2020-09-14 | Stop reason: SDUPTHER

## 2019-11-18 PROBLEM — K22.10 EROSIVE ESOPHAGITIS: Status: ACTIVE | Noted: 2019-11-18

## 2020-02-06 DIAGNOSIS — F51.01 PRIMARY INSOMNIA: ICD-10-CM

## 2020-02-10 NOTE — TELEPHONE ENCOUNTER
Requested Prescriptions     Pending Prescriptions Disp Refills    zolpidem (AMBIEN) 5 mg tablet 20 Tab 5     Sig: Take 1 Tab by mouth nightly as needed for Sleep. Max Daily Amount: 5 mg.      Future Appointments   Date Time Provider Franklin Valeroi   3/4/2020  9:30 AM Andrez Melissa MD [facility]

## 2020-02-11 RX ORDER — ZOLPIDEM TARTRATE 5 MG/1
5 TABLET ORAL
Qty: 20 TAB | Refills: 0 | Status: SHIPPED | OUTPATIENT
Start: 2020-02-11 | End: 2020-03-04 | Stop reason: SDUPTHER

## 2020-02-11 NOTE — TELEPHONE ENCOUNTER
Pt called back to ask about the refill. She does not use nightly, is on a contract, has next available appt with pcp. Is asking for a temp refill until her pcp appt 3/4/2020. Last appt 8/21/2019 written as #20/5.

## 2020-03-04 ENCOUNTER — OFFICE VISIT (OUTPATIENT)
Dept: FAMILY MEDICINE CLINIC | Age: 60
End: 2020-03-04

## 2020-03-04 VITALS
DIASTOLIC BLOOD PRESSURE: 81 MMHG | WEIGHT: 162 LBS | BODY MASS INDEX: 26.99 KG/M2 | HEART RATE: 76 BPM | RESPIRATION RATE: 16 BRPM | SYSTOLIC BLOOD PRESSURE: 134 MMHG | HEIGHT: 65 IN | OXYGEN SATURATION: 97 % | TEMPERATURE: 97.7 F

## 2020-03-04 DIAGNOSIS — Z79.899 HIGH RISK MEDICATION USE: ICD-10-CM

## 2020-03-04 DIAGNOSIS — F51.01 PRIMARY INSOMNIA: Primary | ICD-10-CM

## 2020-03-04 DIAGNOSIS — F13.20 HYPNOTIC DEPENDENCE (HCC): ICD-10-CM

## 2020-03-04 DIAGNOSIS — G89.29 CHRONIC NECK PAIN: ICD-10-CM

## 2020-03-04 DIAGNOSIS — M54.2 CHRONIC NECK PAIN: ICD-10-CM

## 2020-03-04 RX ORDER — ZOLPIDEM TARTRATE 5 MG/1
5 TABLET ORAL
Qty: 30 TAB | Refills: 0 | Status: SHIPPED | OUTPATIENT
Start: 2020-03-04 | End: 2020-04-01

## 2020-03-04 RX ORDER — HYDROCODONE BITARTRATE AND ACETAMINOPHEN 7.5; 325 MG/1; MG/1
1 TABLET ORAL AS NEEDED
Refills: 0 | COMMUNITY
Start: 2020-03-04 | End: 2022-09-08

## 2020-03-04 RX ORDER — HYDROCODONE BITARTRATE AND ACETAMINOPHEN 5; 325 MG/1; MG/1
1 TABLET ORAL 4 TIMES DAILY
COMMUNITY
Start: 2020-03-04 | End: 2020-03-04 | Stop reason: DRUGHIGH

## 2020-03-04 NOTE — PATIENT INSTRUCTIONS
You are on a controlled substance. You will need a follow-up appointment for refills in 3 months (for pain medication or ADD medications) or 6 months (for all other controlled substances). Please make your follow-up appointment today, prior to running out of your medications. Zolpidem (By mouth)   Zolpidem Tartrate (zole-PI-dem TAR-trate)  Treats insomnia. Brand Name(s): Ambien, Ambien CR   There may be other brand names for this medicine. When This Medicine Should Not Be Used: This medicine is not right for everyone. Do not use it if you had an allergic reaction to zolpidem. How to Use This Medicine:   Tablet, Long Acting Tablet  · Take your medicine as directed. Your dose may need to be changed several times to find what works best for you. · This medicine is not for long-term use. · This medicine is usually taken just before bedtime, or when you are having trouble falling asleep. Do not take this medicine if you are not able to sleep or rest for 7 to 8 hours before you need to be active again. · Do not take this medicine with food or right after a meal because it may not work as well. · Do not take this medicine if you have drank alcohol the same evening. · Swallow the extended-release tablet whole. Do not crush, break, or chew it. · This medicine should come with a Medication Guide. Ask your pharmacist for a copy if you do not have one. · Use this medicine only when you cannot sleep. You do not need to take it on a schedule. · Store the medicine in a closed container at room temperature, away from heat, moisture, and direct light. Drugs and Foods to Avoid:   Ask your doctor or pharmacist before using any other medicine, including over-the-counter medicines, vitamins, and herbal products. · Some medicines can affect how zolpidem works. Tell your doctor if you are using chlorpromazine, fluoxetine, imipramine, ketoconazole, rifampin, sertraline, or Matheus's wort.   · Tell your doctor if you use anything else that makes you sleepy. Some examples are allergy medicine, narcotic pain medicine, and alcohol. · Do not drink alcohol while you are using this medicine. Warnings While Using This Medicine:   · Tell your doctor if you are pregnant or breastfeeding, or if you have kidney disease, liver disease, lung disease or breathing problems (such as sleep apnea), or myasthenia gravis. Tell your doctor if you have a history of alcohol or drug addiction, depression, or mental health problems. · This medicine may make you dizzy or drowsy, especially first thing the next morning. Do not drive or do anything that could be dangerous until you know how this medicine affects you. · This medicine may cause unusual moods and behaviors. You may also do things while you are still asleep that you may not remember the next morning, such as driving. Tell your doctor right away if you learn that this has happened. Also tell your doctor if you have any thought or behavior changes (such as problems with gambling or increased sex drive) that concern you. · This medicine can be habit-forming. Do not use more than your prescribed dose. Call your doctor if you think your medicine is not working. · Call your doctor if you still have trouble sleeping after you take this medicine for 7 to 10 days. · Do not stop using this medicine suddenly. Your doctor will need to slowly decrease your dose before you stop it completely. · Keep all medicine out of the reach of children. Never share your medicine with anyone.   Possible Side Effects While Using This Medicine:   Call your doctor right away if you notice any of these side effects:  · Allergic reaction: Itching or hives, swelling in your face or hands, swelling or tingling in your mouth or throat, chest tightness, trouble breathing  · Anxiety, depression, nervousness, unusual behavior, or thoughts of hurting yourself  · Memory loss  · Seeing, hearing, or feeling things that are not there  · Severe confusion, drowsiness, muscle weakness  · Trouble breathing  If you notice these less serious side effects, talk with your doctor:   · Daytime drowsiness  If you notice other side effects that you think are caused by this medicine, tell your doctor. Call your doctor for medical advice about side effects. You may report side effects to FDA at 7-787-TXF-0454  © 2017 [facility] Information is for End User's use only and may not be sold, redistributed or otherwise used for commercial purposes. The above information is an  only. It is not intended as medical advice for individual conditions or treatments. Talk to your doctor, nurse or pharmacist before following any medical regimen to see if it is safe and effective for you.

## 2020-03-04 NOTE — PROGRESS NOTES
Prema Loja is a 61 y.o. female (: 1960) presenting to address:    Chief Complaint   Patient presents with    Insomnia     follow up       Vitals:    20 0934   Resp: 16   Weight: 162 lb (73.5 kg)   Height: 5' 5\" (1.651 m)       Hearing/Vision:   No exam data present    Learning Assessment:     Learning Assessment 2019   PRIMARY LEARNER Patient   HIGHEST LEVEL OF EDUCATION - PRIMARY LEARNER  > 4 YEARS OF COLLEGE   BARRIERS PRIMARY LEARNER NONE   CO-LEARNER CAREGIVER No   PRIMARY LANGUAGE ENGLISH    NEED No   LEARNER PREFERENCE PRIMARY READING   LEARNING SPECIAL TOPICS none   ANSWERED BY patient   RELATIONSHIP SELF   ASSESSMENT COMMENT n/a     Depression Screening:     3 most recent PHQ Screens 3/4/2020   PHQ Not Done -   Little interest or pleasure in doing things Not at all   Feeling down, depressed, irritable, or hopeless Not at all   Total Score PHQ 2 0     Fall Risk Assessment:     Fall Risk Assessment, last 12 mths 2019   Able to walk? Yes   Fall in past 12 months? Yes   Fall with injury? Yes   Number of falls in past 12 months 1   Fall Risk Score 2     Abuse Screening:     Abuse Screening Questionnaire 2019   Do you ever feel afraid of your partner? N   Are you in a relationship with someone who physically or mentally threatens you? N   Is it safe for you to go home? Y     Coordination of Care Questionaire:   1. Have you been to the ER, urgent care clinic since your last visit? Hospitalized since your last visit? YES    2. Have you seen or consulted any other health care providers outside of the [address] [place] [address] since your last visit? Include any pap smears or colon screening. YES    Advanced Directive:   1. Do you have an Advanced Directive? NO    2. Would you like information on Advanced Directives?  NO

## 2020-03-04 NOTE — PROGRESS NOTES
Assessment/Plan:    1. Primary insomnia and hypnotic dependence  -discussed risk of hypnotic use with narcotics. Pt wishes to take ambien every night. Wrote for increased quantity. She is due for UDS. However, it's not covered by insurance and pain management does it. I filled 1 month until I get UDS from pain management. Then will send in remaining 5 mos. - zolpidem (AMBIEN) 5 mg tablet; Take 1 Tab by mouth nightly as needed for Sleep. Max Daily Amount: 5 mg. Dispense: 30 Tab; Refill: 0    2. High risk medication use    3. Chronic neck pain  -per pain management  - HYDROcodone-acetaminophen (NORCO) 7.5-325 mg per tablet; Take 1 Tab by mouth four (4) times daily. Max Daily Amount: 4 Tabs.; Refill: 0      The plan was discussed with the patient. The patient verbalized understanding and is in agreement with the plan. All medication potential side effects were discussed with the patient. Health Maintenance:   Health Maintenance   Topic Date Due    Influenza Age 5 to Adult  08/01/2019    Shingrix Vaccine Age 50> (2 of 2) 10/16/2019    Breast Cancer Screen Mammogram  11/05/2020    PAP AKA CERVICAL CYTOLOGY  09/01/2021    Lipid Screen  08/19/2024    Colon Cancer Screen (Barium / CT Victor / Flex Sig) Q5Y  11/14/2024    DTaP/Tdap/Td series (3 - Td) 12/14/2027    Hepatitis C Screening  Completed    Pneumococcal 0-64 years  Aged Out       Jose Miguel Rascon is a 61 y.o. female and presents with Insomnia (follow up)     Subjective: Insomnia and hypnotic dependence - on ambien. This is a high risk medication given concomitant use of narcotics on a regular basis. Pt is aware of risks. She is requesting 30 tabs to take daily. She currently gets 20/month and doesn't use it on weekends. But states she doesn't get any sleep when she doesn't use it. ROS:  Constitutional: No recent weight change. No weakness/fatigue. No f/c. Cardiovascular: No CP/palpitations. No WILSON/orthopnea/PND.    Respiratory: No cough/sputum, dyspnea, wheezing. Gastointestinal: No dysphagia, reflux. No n/v. No constipation/diarrhea. No melena/rectal bleeding. The problem list was updated as a part of today's visit. Patient Active Problem List   Diagnosis Code    Migraine with aura and without status migrainosus, not intractable G43. 109    Seasonal allergic rhinitis due to pollen J30.1    Primary insomnia F51.01    Chronic neck pain M54.2, G89.29    Acquired hypothyroidism E03.9    Gastroesophageal reflux disease without esophagitis K21.9    Erosive esophagitis K22.10       The PSH, FH were reviewed. SH:  Social History     Tobacco Use    Smoking status: Never Smoker    Smokeless tobacco: Never Used   Substance Use Topics    Alcohol use: Yes     Alcohol/week: 1.0 standard drinks     Types: 1 Glasses of wine per week     Frequency: 4 or more times a week     Drinks per session: 1 or 2     Binge frequency: Never    Drug use: No       Medications/Allergies:  Current Outpatient Medications on File Prior to Visit   Medication Sig Dispense Refill    HYDROcodone-acetaminophen (NORCO) 7.5-325 mg per tablet Take 1 Tab by mouth four (4) times daily. Max Daily Amount: 4 Tabs.  0    zolpidem (AMBIEN) 5 mg tablet Take 1 Tab by mouth nightly as needed for Sleep. Max Daily Amount: 5 mg. 20 Tab 0    azelastine (OPTIVAR) 0.05 % ophthalmic solution ADMINISTER 1 DROP TO BOTH EYES TWO (2) TIMES A DAY. USE IN AFFECTED EYE(S) 6 mL 3    raNITIdine (ZANTAC) 300 mg tab Take 1 Tab by mouth nightly. 90 Tab 3    sertraline (ZOLOFT) 25 mg tablet Take  by mouth daily.  levothyroxine (SYNTHROID) 88 mcg tablet Take 1 Tab by mouth Daily (before breakfast). 90 Tab 3    traZODone (DESYREL) 150 mg tablet Take 1 Tab by mouth nightly. 90 Tab 3    pseudoephedrine (SUDAFED) 30 mg tablet Take  by mouth every four (4) hours as needed for Congestion.  meclizine (ANTIVERT) 25 mg tablet Take  by mouth three (3) times daily as needed.       estradiol-norethindrone (ACTIVELLA) 0.5-0.1 mg per tablet Take 1 Tab by mouth daily.  SUMAtriptan (IMITREX) 6 mg/0.5 mL injection 6 mg by SubCUTAneous route once.  rizatriptan (MAXALT) 10 mg tablet Take 10 mg by mouth once as needed for Migraine. May repeat in 2 hours if needed      erenumab-aooe (AIMOVIG AUTOINJECTOR SC) by SubCUTAneous route.  PSEUDOEPHEDRINE-guaiFENesin (MUCINEX D)  mg per tablet Take 1 Tab by mouth every twelve (12) hours.  pantoprazole (PROTONIX) 40 mg tablet Take 1 Tab by mouth daily. 90 Tab 3     No current facility-administered medications on file prior to visit. Allergies   Allergen Reactions    Nsaids (Non-Steroidal Anti-Inflammatory Drug) Other (comments)     Causes acid reflux    Topamax [Topiramate] Other (comments)     Confusion         Objective:  Visit Vitals  /81 (BP 1 Location: Left arm, BP Patient Position: Sitting)   Pulse 76   Temp 97.7 °F (36.5 °C) (Oral)   Resp 16   Ht 5' 5\" (1.651 m)   Wt 162 lb (73.5 kg)   SpO2 97%   BMI 26.96 kg/m²      Constitutional: Well developed, nourished, no distress, alert, obese habitus   CV: S1, S2.  RRR. No murmurs/rubs. No edema. Pulm: No abnormalities on inspection. Clear to auscultation bilaterally. No wheezing/rhonchi. Normal effort. Psych: Appropriate affect, judgement and insight. Short-term memory intact.

## 2020-03-05 ENCOUNTER — TELEPHONE (OUTPATIENT)
Dept: FAMILY MEDICINE CLINIC | Age: 60
End: 2020-03-05

## 2020-03-05 NOTE — TELEPHONE ENCOUNTER
Called pt back. Tessalon is used to lessen the cough reflex. Pt is not actively coughing. She states she feel chest congestion and wanted the tessalon to help with that as they travel for a family . Suggested valeriano garzon for congestion otc. Pt will try that.

## 2020-03-05 NOTE — TELEPHONE ENCOUNTER
Pt called wanting to know if benzonatate could be sent into the pharmacy. Pt states that she is traveling tomorrow for a  and has taken this medication in the past for congestion. Pt was informed of medication request turn around time and verbalized understanding. Please advise.

## 2020-08-17 DIAGNOSIS — H10.13 ALLERGIC CONJUNCTIVITIS OF BOTH EYES: ICD-10-CM

## 2020-08-18 RX ORDER — AZELASTINE HYDROCHLORIDE 0.5 MG/ML
1 SOLUTION/ DROPS OPHTHALMIC 2 TIMES DAILY
Qty: 6 ML | Refills: 3 | Status: SHIPPED | OUTPATIENT
Start: 2020-08-18 | End: 2022-09-08

## 2020-09-08 DIAGNOSIS — F51.01 PRIMARY INSOMNIA: ICD-10-CM

## 2020-09-10 RX ORDER — ZOLPIDEM TARTRATE 5 MG/1
5 TABLET ORAL
Qty: 30 TAB | Refills: 4 | Status: CANCELLED | OUTPATIENT
Start: 2020-09-10

## 2020-09-10 RX ORDER — ZOLPIDEM TARTRATE 5 MG/1
5 TABLET ORAL
Qty: 30 TAB | Refills: 4 | OUTPATIENT
Start: 2020-09-10

## 2020-09-14 ENCOUNTER — VIRTUAL VISIT (OUTPATIENT)
Dept: FAMILY MEDICINE CLINIC | Age: 60
End: 2020-09-14

## 2020-09-14 DIAGNOSIS — K21.9 GASTROESOPHAGEAL REFLUX DISEASE WITHOUT ESOPHAGITIS: ICD-10-CM

## 2020-09-14 DIAGNOSIS — E03.9 ACQUIRED HYPOTHYROIDISM: ICD-10-CM

## 2020-09-14 DIAGNOSIS — F13.20 HYPNOTIC DEPENDENCE (HCC): ICD-10-CM

## 2020-09-14 DIAGNOSIS — G43.109 MIGRAINE WITH AURA AND WITHOUT STATUS MIGRAINOSUS, NOT INTRACTABLE: ICD-10-CM

## 2020-09-14 DIAGNOSIS — F51.01 PRIMARY INSOMNIA: Primary | ICD-10-CM

## 2020-09-14 DIAGNOSIS — Z00.00 ROUTINE GENERAL MEDICAL EXAMINATION AT A HEALTH CARE FACILITY: ICD-10-CM

## 2020-09-14 DIAGNOSIS — Z79.899 HIGH RISK MEDICATION USE: ICD-10-CM

## 2020-09-14 RX ORDER — LEVOTHYROXINE SODIUM 88 UG/1
88 TABLET ORAL
Qty: 90 TAB | Refills: 3 | Status: SHIPPED | OUTPATIENT
Start: 2020-09-14

## 2020-09-14 RX ORDER — SERTRALINE HYDROCHLORIDE 25 MG/1
25 TABLET, FILM COATED ORAL DAILY
Qty: 90 TAB | Refills: 3 | Status: SHIPPED | OUTPATIENT
Start: 2020-09-14

## 2020-09-14 RX ORDER — RANITIDINE 300 MG/1
300 TABLET ORAL
Qty: 90 TAB | Refills: 3 | Status: SHIPPED | OUTPATIENT
Start: 2020-09-14 | End: 2022-09-08

## 2020-09-14 RX ORDER — PANTOPRAZOLE SODIUM 40 MG/1
40 TABLET, DELAYED RELEASE ORAL DAILY
Qty: 90 TAB | Refills: 3 | Status: SHIPPED | OUTPATIENT
Start: 2020-09-14

## 2020-09-14 RX ORDER — ZOLPIDEM TARTRATE 5 MG/1
5 TABLET ORAL
Qty: 30 TAB | Refills: 5 | Status: SHIPPED | OUTPATIENT
Start: 2020-09-14

## 2020-09-14 NOTE — PROGRESS NOTES
Meryl Zamorano is a 61 y.o. female who was seen by synchronous (real-time) audio-video technology on 9/14/2020 for Follow Up Chronic Condition    Assessment & Plan:   Diagnoses and all orders for this visit:    1. Primary insomnia and hypnotic dependence- refilled  [facility]  reviewed and is in accordance with patient's prescriptions     -     zolpidem (AMBIEN) 5 mg tablet; Take 1 Tab by mouth nightly as needed for Sleep. Max Daily Amount: 5 mg.    3. High risk medication use    4. Gastroesophageal reflux disease without esophagitis  -     raNITIdine (ZANTAC) 300 mg tab; Take 1 Tab by mouth nightly. -     pantoprazole (PROTONIX) 40 mg tablet; Take 1 Tab by mouth daily. 5. Acquired hypothyroidism-pt to schedule labs  -     TSH 3RD GENERATION; Future  -     levothyroxine (SYNTHROID) 88 mcg tablet; Take 1 Tab by mouth Daily (before breakfast). 6. Migraine with aura and without status migrainosus, not intractable- managed by neuro    7. Routine general medical examination at a health care facility  -     CBC W/O DIFF; Future  -     METABOLIC PANEL, COMPREHENSIVE; Future  -     LIPID PANEL; Future    Other orders  -     sertraline (Zoloft) 25 mg tablet; Take 1 Tab by mouth daily. Subjective: Insomnia - daily ambien use. Also is on narcotics daily, prescribed through pain management. Her UDS is also done through pain management b/c insurance doesn't cover. Prior to Admission medications    Medication Sig Start Date End Date Taking? Authorizing Provider   zolpidem (AMBIEN) 5 mg tablet Take 1 Tab by mouth nightly as needed for Sleep. Max Daily Amount: 5 mg. 9/11/20   Nicole Vasquez MD   azelastine (OPTIVAR) 0.05 % ophthalmic solution ADMINISTER 1 DROP TO BOTH EYES TWO (2) TIMES A DAY. USE IN AFFECTED EYE(S) 8/18/20   Nicole Vasquez MD   HYDROcodone-acetaminophen [facility]) 7.5-325 mg per tablet Take 1 Tab by mouth four (4) times daily. Max Daily Amount: 4 Tabs.  3/4/20   Nciole Vasquez MD   raNITIdine (ZANTAC) 300 mg tab Take 1 Tab by mouth nightly. 10/7/19   Grace Juárez MD   sertraline (ZOLOFT) 25 mg tablet Take  by mouth daily. Provider, Historical   levothyroxine (SYNTHROID) 88 mcg tablet Take 1 Tab by mouth Daily (before breakfast). 8/21/19   Grace Juárez MD   traZODone (DESYREL) 150 mg tablet Take 1 Tab by mouth nightly. 7/22/19   Grace Juárez MD   pseudoephedrine (SUDAFED) 30 mg tablet Take  by mouth every four (4) hours as needed for Congestion. Provider, Historical   meclizine (ANTIVERT) 25 mg tablet Take  by mouth three (3) times daily as needed. Provider, Historical   estradiol-norethindrone (ACTIVELLA) 0.5-0.1 mg per tablet Take 1 Tab by mouth daily. Provider, Historical   SUMAtriptan (IMITREX) 6 mg/0.5 mL injection 6 mg by SubCUTAneous route once. Provider, Historical   rizatriptan (MAXALT) 10 mg tablet Take 10 mg by mouth once as needed for Migraine. May repeat in 2 hours if needed    Provider, Historical   erenumab-aooe (AIMOVIG AUTOINJECTOR SC) by SubCUTAneous route. Provider, Historical   PSEUDOEPHEDRINE-guaiFENesin (MUCINEX D)  mg per tablet Take 1 Tab by mouth every twelve (12) hours. Provider, Historical   pantoprazole (PROTONIX) 40 mg tablet Take 1 Tab by mouth daily. 2/6/19   Grace Juárez MD     Patient Active Problem List   Diagnosis Code    Migraine with aura and without status migrainosus, not intractable G43. 109    Seasonal allergic rhinitis due to pollen J30.1    Primary insomnia F51.01    Chronic neck pain M54.2, G89.29    Acquired hypothyroidism E03.9    Gastroesophageal reflux disease without esophagitis K21.9    Erosive esophagitis K22.10    Hypnotic dependence (HCC) F13.20    High risk medication use Z79.899       Review of Systems   Respiratory: Negative. Cardiovascular: Negative. Objective:   No flowsheet data found.      [INSTRUCTIONS:  \"[x]\" Indicates a positive item  \"[]\" Indicates a negative item  -- DELETE ALL ITEMS NOT EXAMINED]    Constitutional: [x] Appears well-developed and well-nourished [x] No apparent distress      [] Abnormal -     Mental status: [x] Alert and awake  [x] Oriented to person/place/time [x] Able to follow commands    [] Abnormal -     Eyes:   EOM    [x]  Normal    [] Abnormal -   Sclera  [x]  Normal    [] Abnormal -          Discharge [x]  None visible   [] Abnormal -     HENT: [x] Normocephalic, atraumatic  [] Abnormal -   [x] Mouth/Throat: Mucous membranes are moist    External Ears [x] Normal  [] Abnormal -    Neck: [x] No visualized mass [] Abnormal -     Pulmonary/Chest: [x] Respiratory effort normal   [x] No visualized signs of difficulty breathing or respiratory distress        [] Abnormal -      Musculoskeletal:   [] Normal gait with no signs of ataxia         [] Normal range of motion of neck        [] Abnormal -     Neurological:        [] No Facial Asymmetry (Cranial nerve 7 motor function) (limited exam due to video visit)          [] No gaze palsy        [] Abnormal -          Skin:        [] No significant exanthematous lesions or discoloration noted on facial skin         [] Abnormal -            Psychiatric:       [x] Normal Affect [] Abnormal -        [x] No Hallucinations    Other pertinent observable physical exam findings:-        We discussed the expected course, resolution and complications of the diagnosis(es) in detail. Medication risks, benefits, costs, interactions, and alternatives were discussed as indicated. I advised her to contact the office if her condition worsens, changes or fails to improve as anticipated. She expressed understanding with the diagnosis(es) and plan. Jose Miguel Alcantar, who was evaluated through a patient-initiated, synchronous (real-time) audio-video encounter, and/or her healthcare decision maker, is aware that it is a billable service, with coverage as determined by her insurance carrier.  She provided verbal consent to proceed: Yes, and patient identification was verified. It was conducted pursuant to the emergency declaration under the [facility], [address] [place] [address] and the [employer] and [employer] Act. A caregiver was present when appropriate. Ability to conduct physical exam was limited. I was in the office. The patient was at home.       Orlando Toney MD

## 2020-10-08 DIAGNOSIS — F51.01 PRIMARY INSOMNIA: ICD-10-CM

## 2020-10-08 DIAGNOSIS — G43.109 MIGRAINE WITH AURA AND WITHOUT STATUS MIGRAINOSUS, NOT INTRACTABLE: ICD-10-CM

## 2020-10-08 RX ORDER — TRAZODONE HYDROCHLORIDE 150 MG/1
TABLET ORAL
Qty: 90 TAB | Refills: 3 | Status: SHIPPED | OUTPATIENT
Start: 2020-10-08

## 2020-10-15 ENCOUNTER — VIRTUAL VISIT (OUTPATIENT)
Dept: NEUROLOGY | Age: 60
End: 2020-10-15
Payer: COMMERCIAL

## 2020-10-15 DIAGNOSIS — G43.019 COMMON MIGRAINE WITH INTRACTABLE MIGRAINE: Primary | ICD-10-CM

## 2020-10-15 PROBLEM — N94.6 DYSMENORRHEA: Status: ACTIVE | Noted: 2020-10-15

## 2020-10-15 PROBLEM — R53.82 CHRONIC FATIGUE: Status: ACTIVE | Noted: 2018-06-13

## 2020-10-15 PROBLEM — F45.8 BRUXISM: Status: ACTIVE | Noted: 2018-06-13

## 2020-10-15 PROBLEM — N94.6 DYSMENORRHEA: Status: RESOLVED | Noted: 2020-10-15 | Resolved: 2020-10-15

## 2020-10-15 PROBLEM — G47.33 OSA (OBSTRUCTIVE SLEEP APNEA): Status: ACTIVE | Noted: 2018-09-17

## 2020-10-15 PROCEDURE — 99215 OFFICE O/P EST HI 40 MIN: CPT | Performed by: PSYCHIATRY & NEUROLOGY

## 2020-10-15 RX ORDER — SERTRALINE HYDROCHLORIDE 50 MG/1
50 TABLET, FILM COATED ORAL DAILY
COMMUNITY
Start: 2019-10-03 | End: 2020-10-15

## 2020-10-15 RX ORDER — FREMANEZUMAB-VFRM 225 MG/1.5ML
INJECTION SUBCUTANEOUS
COMMUNITY
Start: 2020-09-09 | End: 2020-10-21 | Stop reason: DRUGHIGH

## 2020-10-15 RX ORDER — RIMEGEPANT SULFATE 75 MG/75MG
75 TABLET, ORALLY DISINTEGRATING ORAL
Qty: 8 TAB | Refills: 5 | Status: SHIPPED | OUTPATIENT
Start: 2020-10-15 | End: 2021-01-08 | Stop reason: SDUPTHER

## 2020-10-15 NOTE — PROGRESS NOTES
Abelino Smith is a 61 y.o. female who was seen by synchronous (real-time) audio-video technology on 10/15/2020. Abelino Smith is a 61 y.o. female who presents today for the following:  Chief Complaint   Patient presents with    Follow-up    Migraine         HPI  Historical Data    Patient is known to me from my prior practice  Neurologic diagnosis  chronic refractory headaches and migraines    Migraine characteristics  Location: Generally right-sided  Quality: Pounding and throbbing  Intensity: Varies anywhere between mild and incapacitating  Duration: Each migraine can last up to several days  Associated symptoms: Light and sound sensitivity along with nausea and vomiting     Prior therapies tried:. Topamax. Norpramin. Elavil. Bromocriptine [forcatamenial migraines: no longer an issue]. gabapentin. Wellbutrin. lexapro. Aimovig 70 [initial dose September 11, 2018] & 140mg doses  Emgality    Abortive meds tried in the past:.   Darvocet [good relief but taken off the market]. Midrin. [Good results taken off the market]  migranol NS. Medrol dose pack. Insomnia. in past used Ambien but now good results w trazodone. In the past she saw Dr. Parvin Javed for severe neck pain: receiving injections and PT has gotten sustained relief just using Amrix prn. Off Lyrica. Amrix worked well in the past but not having to use any more as this is no longer an issue. Is no longer being followed by Dr. Parvin Javed. Diagnostic testing:.   7/30/15  MRI of the cervical spine with and without contrast.   Multilevel facet hypertrophy with left greater than right however without significant foraminal stenosis. Small left central disk extrusion at C6 -7 with relatively minimal spinal canal narrowing. MRI the head with and without contrast.   Brain is essentially normal with the exception of mild cortical volume loss.      Interim Data:     Patient continues with chronic refractory headaches and migraines  Use of CGRP therapy has definitely given her now at least 10-15 headache and migraine free days per month which is a significant improvement from having headaches and migraines 30 out of 30 days/month  Additionally they are not as severe when she gets them  There is less use of rescue medication  She does use a stratified protocol between Maxalt and Imitrex injection and Norco [which is being prescribed by pain management]    Insomnia well controlled with the use of trazodone        Allergies   Allergen Reactions    Escitalopram Other (comments)     HEADACHE, GI UPSET    Nsaids (Non-Steroidal Anti-Inflammatory Drug) Other (comments)     Causes acid reflux    Topamax [Topiramate] Other (comments)     Confusion         Current Outpatient Medications   Medication Sig    Ajovy Syringe 225 mg/1.5 mL syrg INJECT 4.5 MLS ON THE SAME DAY EVERY 90 DAYS    rimegepant (Nurtec ODT) 75 mg disintegrating tablet Take 1 Tab by mouth daily as needed for Migraine (Take at onset of severe migraine). Indications: a migraine headache    traZODone (DESYREL) 150 mg tablet TAKE 1 TABLET BY MOUTH EVERY DAY AT NIGHT    zolpidem (AMBIEN) 5 mg tablet Take 1 Tab by mouth nightly as needed for Sleep. Max Daily Amount: 5 mg.  raNITIdine (ZANTAC) 300 mg tab Take 1 Tab by mouth nightly.  sertraline (Zoloft) 25 mg tablet Take 1 Tab by mouth daily.  levothyroxine (SYNTHROID) 88 mcg tablet Take 1 Tab by mouth Daily (before breakfast).  pantoprazole (PROTONIX) 40 mg tablet Take 1 Tab by mouth daily.  azelastine (OPTIVAR) 0.05 % ophthalmic solution ADMINISTER 1 DROP TO BOTH EYES TWO (2) TIMES A DAY. USE IN AFFECTED EYE(S)    HYDROcodone-acetaminophen (NORCO) 7.5-325 mg per tablet Take 1 Tab by mouth as needed.  pseudoephedrine (SUDAFED) 30 mg tablet Take  by mouth every four (4) hours as needed for Congestion.  meclizine (ANTIVERT) 25 mg tablet Take  by mouth three (3) times daily as needed.     estradiol-norethindrone (ACTIVELLA) 0.5-0.1 mg per tablet Take 1 Tab by mouth daily.  SUMAtriptan (IMITREX) 6 mg/0.5 mL injection 6 mg by SubCUTAneous route once.  rizatriptan (MAXALT) 10 mg tablet Take 10 mg by mouth once as needed for Migraine. May repeat in 2 hours if needed    PSEUDOEPHEDRINE-guaiFENesin [facility])  mg per tablet Take 1 Tab by mouth every twelve (12) hours. No current facility-administered medications for this visit. Past Medical History:   Diagnosis Date    Allergy     seasonal    Dysmenorrhea 10/15/2020    GERD (gastroesophageal reflux disease)     Hyperlipidemia with target LDL less than 130 11/10/2011    Insomnia     Migraine     Thyroid disease     Vertigo        Past Surgical History:   Procedure Laterality Date    HX ABDOMINOPLASTY      HX GYN      BTL       Family History   Problem Relation Age of Onset    Heart Attack Father     Other Father        Social History     Socioeconomic History    Marital status: UNKNOWN     Spouse name: Not on file    Number of children: Not on file    Years of education: Not on file    Highest education level: Not on file   Tobacco Use    Smoking status: Never Smoker    Smokeless tobacco: Never Used   Substance and Sexual Activity    Alcohol use: Yes     Alcohol/week: 1.0 standard drinks     Types: 1 Glasses of wine per week     Frequency: 4 or more times a week     Drinks per session: 1 or 2     Binge frequency: Never    Drug use: No    Sexual activity: Yes     Partners: Male     Birth control/protection: None       ROS    A ten system review of constitutional, cardiovascular, respiratory, musculoskeletal, endocrine, skin, SHEENT, genitourinary, psychiatric and neurologic systems was obtained and is unremarkable except as mentioned under HPI          EXAMINATION    General appearance: Patient is well-developed and well-nourished in no apparent distress and well groomed.     Psych/mental health:  Affect: Appropriate    PHQ  3 most recent PHQ Screens 3/4/2020   PHQ Not Done -   Little interest or pleasure in doing things Not at all   Feeling down, depressed, irritable, or hopeless Not at all   Total Score PHQ 2 0       HEENT: Normocephalic, without evidence of trauma      Cardiovascular: N/A    Respiratory: No dyspnea on exertion noted, normal effort on casual observation    Musculoskeletal: No evidence of significant bony deformities or spinal curvature    Integumentary:  No obvious bruising, lacerations or discoloaration on casual observation. Neurological Examination:   Mental Status:        MMSE  No flowsheet data found. Formal testing was not completed    there was nothing concerning on general observation and discussion. Alert oriented and appropriate to general conversation  Normal processing on general observation  Followed conversation and responded seemingly appropriate throughout the office visit  No word finding difficulties noted on casual observation  Able to follow directions without difficulty     Cranial Nerves:    EOMs intact gaze is conjugate  No nystagmus is appreciated  Facial motor intact bilaterally  Hearing intact to conversation  Voice with normal projection, no evidence of secretion pooling  Shoulder shrug intact bilaterally  No tongue deviation appreciated     Motor:   Normal bulk  Fine dexterity intact bilaterally  No tremor appreciated on today's exam  No abnormal movements appreciated on today's exam      Sensation: Not tested    Coordination/Cerebellar:   Grossly intact    Gait: Not tested    Fall risk assessment  Fall Risk Assessment, last 12 mths 8/21/2019   Able to walk? Yes   Fall in past 12 months? Yes   Fall with injury?  Yes   Number of falls in past 12 months 1   Fall Risk Score 2         ASSESSMENT AND PLAN    Patient is known to me from my prior practice  She continues with chronic refractory headaches and migraines  Both the use of CGRP therapy for her they are probably as good as they are going to get  She has been on all 3 versions of CGRP therapy with clearly without CGRP specificity as she has the same results no matter which product she uses  For now she is on Ajovy and that seems to be going well with her again it takes several days to kick in and wears off within a few days and during those times headaches seem most severe    She is to continue to stratified protocol between Maxalt oral, Imitrex injectable, and Almetta Kishan [Norco is prescribed for pain management]  I do think it be reasonable to try her on NurTec to see if she gets any significant benefit from that regarding rescue medication over what she is already using    My only other recommendation for her would be to minimize use of Sudafed and Sudafed-containing products as that can add to rebounding    Insomnia  Remains well controlled continue on trazodone        ICD-10-CM ICD-9-CM    1. Common migraine with intractable migraine  G43.019 346.11 rimegepant (Nurtec ODT) 75 mg disintegrating tablet             Additional pertinent medical data reviewed at today's office visit         No visits with results within 3 Month(s) from this visit.    Latest known visit with results is:   Hospital Outpatient Visit on 08/19/2019   Component Date Value    WBC 08/19/2019 5.4     RBC 08/19/2019 4.23     HGB 08/19/2019 13.3     HCT 08/19/2019 40.9     MCV 08/19/2019 96.7     MCH 08/19/2019 31.4     MCHC 08/19/2019 32.5     RDW 08/19/2019 13.6     PLATELET 13/63/4873 921     MPV 08/19/2019 10.8     Sodium 08/19/2019 141     Potassium 08/19/2019 4.3     Chloride 08/19/2019 107     CO2 08/19/2019 27     Anion gap 08/19/2019 7     Glucose 08/19/2019 96     BUN 08/19/2019 25*    Creatinine 08/19/2019 0.93     BUN/Creatinine ratio 08/19/2019 27*    GFR est AA 08/19/2019 >60     GFR est non-AA 08/19/2019 >60     Calcium 08/19/2019 8.5     Bilirubin, total 08/19/2019 0.4     ALT (SGPT) 08/19/2019 25     AST (SGOT) 08/19/2019 20     Alk. phosphatase 08/19/2019 54     Protein, total 08/19/2019 7.2     Albumin 08/19/2019 4.3     Globulin 08/19/2019 2.9     A-G Ratio 08/19/2019 1.5     LIPID PROFILE 08/19/2019         Cholesterol, total 08/19/2019 238*    Triglyceride 08/19/2019 166*    HDL Cholesterol 08/19/2019 60     LDL, calculated 08/19/2019 144.8*    VLDL, calculated 08/19/2019 33.2     CHOL/HDL Ratio 08/19/2019 4.0     TSH 08/19/2019 1.20        XR Results (maximum last 3): No results found for this or any previous visit. CT Results (maximum last 3): No results found for this or any previous visit. MRI Results (maximum last 3): No results found for this or any previous visit. Due to this being a TeleHealth evaluation, many elements of the physical examination are unable to be assessed. Pursuant to the emergency declaration under the [address] [place] [address], [facility] waiver authority and the [employer] Act, this Virtual  Visit was conducted, with patient's consent, to reduce the patient's risk of exposure to COVID-19 and provide continuity of care for an established patient. Services were provided through a video synchronous discussion virtually to substitute for in-person clinic visit. Follow-up and Dispositions    · Return in about 6 months (around 4/15/2021).            Octavio Bedolla MS, ANP-BC, [facility]

## 2020-10-15 NOTE — PATIENT INSTRUCTIONS
Office Policies 
 
o Phone calls/patient messages: Please allow up to 24 hours for someone in the office to contact you about your call or message. Be mindful your provider may be out of the office or your message may require further review. We encourage you to use [employer] for your messages as this is a faster, more efficient way to communicate with our office 
 
o Medication Refills: 
Prescription medications require up to 48 business hours to process. We encourage you to use [employer] for your refills. For controlled medications: Please allow up to 72 business hours to process. Certain medications may require you to  a written prescription at our office. NO narcotic/controlled medications will be prescribed after 4pm Monday through Friday or on weekends 
 
o Form/Paperwork Completion: We ask that you allow 7-14 business days. You may also download your forms to [employer] to have your doctor print off.

## 2020-10-20 ENCOUNTER — PATIENT MESSAGE (OUTPATIENT)
Dept: NEUROLOGY | Age: 60
End: 2020-10-20

## 2020-10-20 DIAGNOSIS — G43.019 COMMON MIGRAINE WITH INTRACTABLE MIGRAINE: Primary | ICD-10-CM

## 2020-10-21 RX ORDER — FREMANEZUMAB-VFRM 225 MG/1.5ML
4.5 INJECTION SUBCUTANEOUS
Qty: 4.5 ML | Refills: 3 | Status: SHIPPED | OUTPATIENT
Start: 2020-10-21 | End: 2021-11-23

## 2020-10-21 NOTE — TELEPHONE ENCOUNTER
Stacy Reyes MD 10/20/2020 4:38 PM EDT      ----- Message -----  From: Joby Grewal LPN  Sent:  2:52 PM EDT  To: Bell Grubbs MD  Subject: FW: Prescription Question       ----- Message -----  From: Ramon Lozada LPN  Sent:  2:49 PM EDT  To: Eleazar Parks LPN  Subject: FW: Prescription Question       ----- Message -----  From: Jennifer Hobson  Sent: 10/20/2020 2:14 PM EDT  To: [facility] Nurse [place]  Subject: Prescription Question     Please refill my Ajovy prescription. It hasn't been refilled since 2020, and the previous prescription . It was not refilled on October 15. My pharmacy is [facility] at [address] [place] [address] [phone].

## 2021-01-08 DIAGNOSIS — G43.019 COMMON MIGRAINE WITH INTRACTABLE MIGRAINE: ICD-10-CM

## 2021-01-08 RX ORDER — RIMEGEPANT SULFATE 75 MG/75MG
75 TABLET, ORALLY DISINTEGRATING ORAL
Qty: 8 TAB | Refills: 5 | Status: SHIPPED | OUTPATIENT
Start: 2021-01-08 | End: 2022-02-28 | Stop reason: SINTOL

## 2021-01-11 ENCOUNTER — APPOINTMENT (OUTPATIENT)
Dept: FAMILY MEDICINE CLINIC | Age: 61
End: 2021-01-11

## 2021-01-11 DIAGNOSIS — E03.9 ACQUIRED HYPOTHYROIDISM: ICD-10-CM

## 2021-01-11 DIAGNOSIS — Z00.00 ROUTINE GENERAL MEDICAL EXAMINATION AT A HEALTH CARE FACILITY: ICD-10-CM

## 2021-01-12 LAB
ALBUMIN SERPL-MCNC: 4.4 G/DL (ref 3.8–4.9)
ALBUMIN/GLOB SERPL: 1.6 {RATIO} (ref 1.2–2.2)
ALP SERPL-CCNC: 53 IU/L (ref 39–117)
ALT SERPL-CCNC: 18 IU/L (ref 0–32)
AST SERPL-CCNC: 19 IU/L (ref 0–40)
BILIRUB SERPL-MCNC: 0.3 MG/DL (ref 0–1.2)
BUN SERPL-MCNC: 17 MG/DL (ref 8–27)
BUN/CREAT SERPL: 17 (ref 12–28)
CALCIUM SERPL-MCNC: 8.9 MG/DL (ref 8.7–10.3)
CHLORIDE SERPL-SCNC: 103 MMOL/L (ref 96–106)
CHOLEST SERPL-MCNC: 218 MG/DL (ref 100–199)
CO2 SERPL-SCNC: 23 MMOL/L (ref 20–29)
CREAT SERPL-MCNC: 0.98 MG/DL (ref 0.57–1)
ERYTHROCYTE [DISTWIDTH] IN BLOOD BY AUTOMATED COUNT: 13.2 % (ref 11.7–15.4)
GLOBULIN SER CALC-MCNC: 2.7 G/DL (ref 1.5–4.5)
GLUCOSE SERPL-MCNC: 93 MG/DL (ref 65–99)
HCT VFR BLD AUTO: 39.7 % (ref 34–46.6)
HDLC SERPL-MCNC: 58 MG/DL
HGB BLD-MCNC: 13.2 G/DL (ref 11.1–15.9)
INTERPRETATION, 910389: NORMAL
LDLC SERPL CALC-MCNC: 135 MG/DL (ref 0–99)
MCH RBC QN AUTO: 31.4 PG (ref 26.6–33)
MCHC RBC AUTO-ENTMCNC: 33.2 G/DL (ref 31.5–35.7)
MCV RBC AUTO: 94 FL (ref 79–97)
PLATELET # BLD AUTO: 272 X10E3/UL (ref 150–450)
POTASSIUM SERPL-SCNC: 4.3 MMOL/L (ref 3.5–5.2)
PROT SERPL-MCNC: 7.1 G/DL (ref 6–8.5)
RBC # BLD AUTO: 4.21 X10E6/UL (ref 3.77–5.28)
SODIUM SERPL-SCNC: 139 MMOL/L (ref 134–144)
TRIGL SERPL-MCNC: 139 MG/DL (ref 0–149)
TSH SERPL DL<=0.005 MIU/L-ACNC: 2.99 UIU/ML (ref 0.45–4.5)
VLDLC SERPL CALC-MCNC: 25 MG/DL (ref 5–40)
WBC # BLD AUTO: 6 X10E3/UL (ref 3.4–10.8)

## 2021-01-12 NOTE — PROGRESS NOTES
Call - labs look good, cholesterol has come down some from the previous year. Continue to watch diet and exercise regularly.

## 2021-01-13 ENCOUNTER — TELEPHONE (OUTPATIENT)
Dept: NEUROLOGY | Age: 61
End: 2021-01-13

## 2021-01-13 NOTE — TELEPHONE ENCOUNTER
Zaria denied. Quinn Sood - I will send you my response to the denial and let me know if you would like to add or revise my statement. Letter scanned to chart.

## 2021-01-13 NOTE — TELEPHONE ENCOUNTER
Nurte PA sent to [facility]S. via [facility]    [id number]     Response to contact member's plan. I emailed Ubaldo/DAMARI for further assistance.

## 2021-01-20 ENCOUNTER — TELEPHONE (OUTPATIENT)
Dept: NEUROLOGY | Age: 61
End: 2021-01-20

## 2021-01-20 NOTE — TELEPHONE ENCOUNTER
[facility] - rec'd denial letter but unclear if this is the first denial or if they are denying our appeal.

## 2021-01-28 ENCOUNTER — TELEPHONE (OUTPATIENT)
Dept: NEUROLOGY | Age: 61
End: 2021-01-28

## 2021-01-28 NOTE — TELEPHONE ENCOUNTER
Nurtec approved per rep over phone. Approval letter forthcoming.      Faxed albert to [facility] of approval.

## 2021-02-02 ENCOUNTER — TELEPHONE (OUTPATIENT)
Dept: NEUROLOGY | Age: 61
End: 2021-02-02

## 2021-06-10 ENCOUNTER — TELEPHONE (OUTPATIENT)
Dept: NEUROLOGY | Age: 61
End: 2021-06-10

## 2021-06-10 NOTE — TELEPHONE ENCOUNTER
Traci BARNETT request.  Unable to complete online w/ CMM. Faxed notes to [address] [place] [address] to [phone]   [phone].

## 2021-11-18 ENCOUNTER — TELEPHONE (OUTPATIENT)
Dept: NEUROLOGY | Age: 61
End: 2021-11-18

## 2021-11-23 DIAGNOSIS — G43.019 COMMON MIGRAINE WITH INTRACTABLE MIGRAINE: ICD-10-CM

## 2021-11-23 RX ORDER — FREMANEZUMAB-VFRM 225 MG/1.5ML
INJECTION SUBCUTANEOUS
Qty: 4.5 ML | Refills: 3 | Status: SHIPPED | OUTPATIENT
Start: 2021-11-23

## 2021-12-10 ENCOUNTER — TELEPHONE (OUTPATIENT)
Dept: NEUROLOGY | Age: 61
End: 2021-12-10

## 2021-12-10 NOTE — TELEPHONE ENCOUNTER
Ajovy approved    Approved   today  CaseId:[id number];Status:Approved; Review Type:Prior Auth; Coverage Start Date:12/10/2021; Coverage End Date:12/10/2022;  Drug  AJOVY (fremanezumab-vfrm) 225MG/1.5ML auto-injectors  Form  [employer] Electronic PA Form ([id number] NCPDP)    Faxed to [facility] and sent pt message of approval.

## 2021-12-10 NOTE — TELEPHONE ENCOUNTER
Traci BARNETT sent to [employer] as a continuation of treatment via [facility]. Betty Nash      CASE [id number]   Status is pending. Sent fax [facility] [place] of current status and may try to run it over the weekend to see if it approves.

## 2022-01-21 ENCOUNTER — TELEPHONE (OUTPATIENT)
Dept: NEUROLOGY | Age: 62
End: 2022-01-21

## 2022-01-21 NOTE — TELEPHONE ENCOUNTER
Nurtec PA - her previous plan [employer] denied Nurtec.  (could not use while also taking preventative CGRP). New plan trice/ Andrea Douglas [id number]   has approved it today. KMIBIQ:[id number];RNSSXG:[id number]; Review Type:Prior Auth; Coverage Start Date:01/21/2022; Coverage End Date:01/21/2023; Faxed to [facility] in  [address] [place] [address].

## 2022-02-28 ENCOUNTER — VIRTUAL VISIT (OUTPATIENT)
Dept: NEUROLOGY | Age: 62
End: 2022-02-28
Payer: COMMERCIAL

## 2022-02-28 DIAGNOSIS — G43.019 COMMON MIGRAINE WITH INTRACTABLE MIGRAINE: Primary | ICD-10-CM

## 2022-02-28 PROBLEM — M21.619 BUNION: Status: ACTIVE | Noted: 2022-02-28

## 2022-02-28 PROCEDURE — 99215 OFFICE O/P EST HI 40 MIN: CPT | Performed by: PSYCHIATRY & NEUROLOGY

## 2022-02-28 RX ORDER — RIZATRIPTAN BENZOATE 10 MG/1
TABLET ORAL
Qty: 9 TABLET | Refills: 5 | Status: SHIPPED | OUTPATIENT
Start: 2022-02-28 | End: 2022-08-26

## 2022-02-28 RX ORDER — SUMATRIPTAN 6 MG/.5ML
6 INJECTION, SOLUTION SUBCUTANEOUS
Qty: 2 EACH | Refills: 5
Start: 2022-02-28

## 2022-02-28 NOTE — ASSESSMENT & PLAN NOTE
Since starting CGRP there is been a reduction in frequency intensity and duration as well as a reduction in rescue medication utilization and a reduction in this time at work/personal life    However patient has only had a 30% reduction in her headaches and migraines total frequency  Although intensity and duration have improved    She might be a better responder to Botox was discussed in the office and I think it is reasonable to try the Botox  Patient understands if she gets approved for the Botox she will have to discontinue the Ajovy   Patient is to discuss with the pain management team about getting the Botox through them or through a local practice.   Dr. Guerrier [place] office does do Botox in her region    She continues to use stratified protocol related to rescue medications to include NurTec Maxalt Imitrex injectable and Norco [followed by pain management]

## 2022-02-28 NOTE — PROGRESS NOTES
[facility]LE[facility]ALTH Virtual FOLLOW-UP VISIT        Jennifer Padilla is a 64 y.o. female who was seen by synchronous (real-time) audio-video technology on 2/28/2022. Jennifer Padilla is a 64 y.o. female who presents today for the following:  Chief Complaint   Patient presents with    Follow-up     follow up on migraines and is not having any new symptoms. ASSESSMENT AND PLAN      1. Common migraine with intractable migraine  Assessment & Plan:  Since starting CGRP there is been a reduction in frequency intensity and duration as well as a reduction in rescue medication utilization and a reduction in this time at work/personal life    However patient has only had a 30% reduction in her headaches and migraines total frequency  Although intensity and duration have improved    She might be a better responder to Botox was discussed in the office and I think it is reasonable to try the Botox  Patient understands if she gets approved for the Botox she will have to discontinue the Ajovy   Patient is to discuss with the pain management team about getting the Botox through them or through a local practice. Dr. Guerrier [place] office does do Botox in her region    She continues to use stratified protocol related to rescue medications to include NurTec Maxalt Imitrex injectable and Norco [followed by pain management]      Orders:  -     rizatriptan (MAXALT) 10 mg tablet; 1 tablet at onset of migraine may repeat every 2 hours as needed not to exceed a total of 3 doses in 24 hours  Indications: a migraine headache, Normal, Disp-9 Tablet, R-5  -     SUMAtriptan (IMITREX) 6 mg/0.5 mL injection; 0.5 mL by SubCUTAneous route every one (1) hour as needed for PRN Reason (Other) (Severe migraine) for up to 6 doses. Indications: a migraine headache, No Print, Disp-2 Each, R-5Please dispense 2 injections      Patient and/or family was given time to ask questions and voice concerns.  I believe all questions concerns were adequately addressed at this  office visit. Patient and/or family also verbalized agreement and understanding of the above-stated plan    Patient remains a complex patient secondary to polypharmacy, significant comorbid conditions, and use of high-risk medications which complicate the decision making process related to patient's neurologic diagnosis    Follow-up and Dispositions    · Return in about 6 months (around 8/28/2022) for Virtual visit. ICD-10-CM ICD-9-CM    1. Common migraine with intractable migraine  G43.019 346.11 rizatriptan (MAXALT) 10 mg tablet      SUMAtriptan (IMITREX) 6 mg/0.5 mL injection           I attest that 40 minutes was spent on today's visit reviewing medical records and diagnostic testing deemed pertinent to this patient's care, along with direct time spent at patient's visit including the history, physical assessment and plan, discussing diagnosis and management along with documentation. HPI  Historical Data  Patient is known to me from my prior practice  Neurologic diagnosis  chronic refractory headaches and migraines     Migraine characteristics  Location: Generally right-sided  Quality: Pounding and throbbing  Intensity: Varies anywhere between mild and incapacitating  Duration: Each migraine can last up to several days  Associated symptoms: Light and sound sensitivity along with nausea and vomiting      Prior therapies tried:. Topamax. Norpramin. Elavil. Bromocriptine [forcatamenial migraines: no longer an issue]. gabapentin. Wellbutrin. lexapro. Aimovig 70 [initial dose September 11, 2018] & 140mg doses  Emgality  Ajovy     Abortive meds tried in the past:.   Darvocet [good relief but taken off the market]. Midrin. [Good results taken off the market]  migranol NS. Medrol dose pack. Norco  Maxalt  Imitrex injectable        Insomnia.    in past used Ambien but now good results w trazodone.      In the past she saw Dr. Lelo Doran for severe neck pain: receiving injections and PT has gotten sustained relief just using Amrix prn. Off Lyrica. Amrix worked well in the past but not having to use any more as this is no longer an issue. Is no longer being followed by Dr. Lelo Doran. Other  Retired August 2021    Interim Data:      Patient continues with chronic refractory headaches and migraines  Patient has been using CGRP therapy since 2020  She is now having 10-15 headache/migraine free days per month    Additionally they are not as severe when she gets them  There is less use of rescue medication    She does use a stratified protocol between Maxalt and Imitrex injection and Norco [which is being prescribed by pain management]     Insomnia well controlled with the use of trazodone      Pertinent diagnostic testing.   7/30/15  MRI of the cervical spine with and without contrast.   Multilevel facet hypertrophy with left greater than right however without significant foraminal stenosis. Small left central disk extrusion at C6 -7 with relatively minimal spinal canal narrowing.      MRI the head with and without contrast.   Brain is essentially normal with the exception of mild cortical volume loss. No results found for this or any previous visit. Allergies   Allergen Reactions    Nurtec Odt [Rimegepant] Nausea Only    Escitalopram Other (comments)     HEADACHE, GI UPSET    Nsaids (Non-Steroidal Anti-Inflammatory Drug) Other (comments)     Causes acid reflux    Topamax [Topiramate] Other (comments)     Confusion         Current Outpatient Medications   Medication Sig    rizatriptan (MAXALT) 10 mg tablet 1 tablet at onset of migraine may repeat every 2 hours as needed not to exceed a total of 3 doses in 24 hours  Indications: a migraine headache    SUMAtriptan (IMITREX) 6 mg/0.5 mL injection 0.5 mL by SubCUTAneous route every one (1) hour as needed for PRN Reason (Other) (Severe migraine) for up to 6 doses. Indications: a migraine headache    Ajovy Autoinjector 225 mg/1.5 mL auto-injector 4.5 ML BY SUBCUTANEOUS ROUTE EVERY THREE (3) MONTHS.  traZODone (DESYREL) 150 mg tablet TAKE 1 TABLET BY MOUTH EVERY DAY AT NIGHT    zolpidem (AMBIEN) 5 mg tablet Take 1 Tab by mouth nightly as needed for Sleep. Max Daily Amount: 5 mg.  raNITIdine (ZANTAC) 300 mg tab Take 1 Tab by mouth nightly.  sertraline (Zoloft) 25 mg tablet Take 1 Tab by mouth daily.  levothyroxine (SYNTHROID) 88 mcg tablet Take 1 Tab by mouth Daily (before breakfast).  pantoprazole (PROTONIX) 40 mg tablet Take 1 Tab by mouth daily.  azelastine (OPTIVAR) 0.05 % ophthalmic solution ADMINISTER 1 DROP TO BOTH EYES TWO (2) TIMES A DAY. USE IN AFFECTED EYE(S)    HYDROcodone-acetaminophen (NORCO) 7.5-325 mg per tablet Take 1 Tab by mouth as needed.  pseudoephedrine (SUDAFED) 30 mg tablet Take  by mouth every four (4) hours as needed for Congestion.  meclizine (ANTIVERT) 25 mg tablet Take  by mouth three (3) times daily as needed.  estradiol-norethindrone (ACTIVELLA) 0.5-0.1 mg per tablet Take 1 Tab by mouth daily.  PSEUDOEPHEDRINE-guaiFENesin (MUCINEX D)  mg per tablet Take 1 Tab by mouth every twelve (12) hours. No current facility-administered medications for this visit. Past medical history/surgical history, family history, and social history have been reviewed for today's visit      ROS    A ten system review of constitutional, cardiovascular, respiratory, musculoskeletal, endocrine, skin, SHEENT, genitourinary, psychiatric and neurologic systems was obtained and is unremarkable except as mentioned under HPI          EXAMINATION: Within the context of virtual telehealth visit:    General appearance: Patient is well-developed and well-nourished in no apparent distress and well groomed.     Psych/mental health:  Affect: Appropriate    PHQ  3 most recent PHQ Screens 2/28/2022   PHQ Not Done -   Little interest or pleasure in doing things Not at all   Feeling down, depressed, irritable, or hopeless Not at all   Total Score PHQ 2 0       HEENT:   WNL      Cardiovascular: WNL    Respiratory:   WNL    Musculoskeletal:   WNL    Integumentary:    WNL      Neurological Examination:   Mental Status:        MMSE  No flowsheet data found. Formal testing was not completed    there was nothing concerning on general observation and discussion. Alert oriented and appropriate to general conversation  Normal processing on general observation  Followed conversation and responded seemingly appropriate throughout the office visit  No word finding difficulties noted on casual observation  Able to follow directions without difficulty     Cranial Nerves:    Grossly intact    Motor:   Normal bulk  No tremor appreciated on today's exam  No abnormal movements appreciated on today's exam  Moves extremities spontaneously and with purpose      Sensation: Not tested    Coordination/Cerebellar:   Grossly intact    Gait: Not tested    Fall risk assessment  Fall Risk Assessment, last 12 mths 8/21/2019   Able to walk? Yes   Fall in past 12 months? Yes   Number of falls in past 12 months 1   Fall with injury? 1           Due to this being a TeleHealth evaluation, many elements of the physical examination are unable to be assessed. Pursuant to the emergency declaration under the [facility], [facility] waiver authority and the [employer] General Act, this Virtual  Visit was conducted, with patient's consent, to reduce the patient's risk of exposure to COVID-19 and provide continuity of care for an established patient. Services were provided through a video synchronous discussion virtually to substitute for in-person clinic visit.           Elise Martinez MS, ANP-BC, [facility]

## 2022-03-18 PROBLEM — K22.10 EROSIVE ESOPHAGITIS: Status: ACTIVE | Noted: 2019-11-18

## 2022-03-18 PROBLEM — G47.33 OSA (OBSTRUCTIVE SLEEP APNEA): Status: ACTIVE | Noted: 2018-09-17

## 2022-03-18 PROBLEM — J30.1 SEASONAL ALLERGIC RHINITIS DUE TO POLLEN: Status: ACTIVE | Noted: 2019-02-06

## 2022-03-18 PROBLEM — G43.109 MIGRAINE WITH AURA AND WITHOUT STATUS MIGRAINOSUS, NOT INTRACTABLE: Status: ACTIVE | Noted: 2019-02-06

## 2022-03-18 PROBLEM — K21.9 GASTROESOPHAGEAL REFLUX DISEASE WITHOUT ESOPHAGITIS: Status: ACTIVE | Noted: 2019-02-06

## 2022-03-19 PROBLEM — F51.01 PRIMARY INSOMNIA: Status: ACTIVE | Noted: 2019-02-06

## 2022-03-19 PROBLEM — E03.9 ACQUIRED HYPOTHYROIDISM: Status: ACTIVE | Noted: 2019-02-06

## 2022-03-19 PROBLEM — G43.019 COMMON MIGRAINE WITH INTRACTABLE MIGRAINE: Status: ACTIVE | Noted: 2022-02-28

## 2022-03-19 PROBLEM — M54.2 CHRONIC NECK PAIN: Status: ACTIVE | Noted: 2019-02-06

## 2022-03-19 PROBLEM — F45.8 BRUXISM: Status: ACTIVE | Noted: 2018-06-13

## 2022-03-19 PROBLEM — R53.82 CHRONIC FATIGUE: Status: ACTIVE | Noted: 2018-06-13

## 2022-03-19 PROBLEM — G89.29 CHRONIC NECK PAIN: Status: ACTIVE | Noted: 2019-02-06

## 2022-03-19 PROBLEM — M21.619 BUNION: Status: ACTIVE | Noted: 2022-02-28

## 2022-03-19 PROBLEM — Z79.899 HIGH RISK MEDICATION USE: Status: ACTIVE | Noted: 2020-03-04

## 2022-03-20 PROBLEM — F13.20 HYPNOTIC DEPENDENCE (HCC): Status: ACTIVE | Noted: 2020-03-04

## 2022-08-26 DIAGNOSIS — G43.019 COMMON MIGRAINE WITH INTRACTABLE MIGRAINE: ICD-10-CM

## 2022-08-26 RX ORDER — RIZATRIPTAN BENZOATE 10 MG/1
TABLET ORAL
Qty: 9 TABLET | Refills: 5 | Status: SHIPPED | OUTPATIENT
Start: 2022-08-26

## 2022-09-08 ENCOUNTER — VIRTUAL VISIT (OUTPATIENT)
Dept: NEUROLOGY | Age: 62
End: 2022-09-08
Payer: COMMERCIAL

## 2022-09-08 DIAGNOSIS — F43.9 STRESS AT HOME: ICD-10-CM

## 2022-09-08 DIAGNOSIS — G43.019 COMMON MIGRAINE WITH INTRACTABLE MIGRAINE: Primary | ICD-10-CM

## 2022-09-08 DIAGNOSIS — F51.01 PRIMARY INSOMNIA: ICD-10-CM

## 2022-09-08 PROCEDURE — 99215 OFFICE O/P EST HI 40 MIN: CPT | Performed by: PSYCHIATRY & NEUROLOGY

## 2022-09-08 RX ORDER — VENLAFAXINE HYDROCHLORIDE 37.5 MG/1
CAPSULE, EXTENDED RELEASE ORAL
COMMUNITY
Start: 2022-08-30

## 2022-09-08 RX ORDER — GABAPENTIN 100 MG/1
CAPSULE ORAL
Qty: 180 CAPSULE | Refills: 2 | Status: SHIPPED | OUTPATIENT
Start: 2022-09-08

## 2022-09-08 NOTE — PROGRESS NOTES
[facility]LE[facility] Virtual FOLLOW-UP VISIT        Evelyn Krishna is a 64 y.o. female who was seen by synchronous (real-time) audio-video technology on 9/8/2022. Evelyn Krishna is a 64 y.o. female who presents today for the following:  Chief Complaint   Patient presents with    Follow-up     States that family crisis is making her migraines worse back in spring did go see pain management and Deryl Brake was prescribed and Calvin De Anda  wants to discuss which would be better for her migraines. Patient is taking effexor and clonazepam.          ASSESSMENT AND PLAN      1. Common migraine with intractable migraine  Assessment & Plan:   In general, since starting CGRP there is been a reduction in frequency intensity and duration as well as a reduction in rescue medication utilization and a reduction in this time at work/personal life    She has had about a 30% reduction in her headaches and migraines total but intensity and duration have improved with a reduction in rescue medication and improved quality of life    More recently due to family crisis HAs migraines have escalated    She is to continue on Ajovy presently  Will prescribe Ubrelvy 100 mg number 16 tablets to use for rescue for more moderate to severe migraines first-line with backup of rizatriptan    For the more mild to moderate headaches I prescribed gabapentin  Gabapentin 100 mg 1 to 2 tablets up to 3  times a day as needed for breakthrough headache pain    Patient does not use OTCs but I did emphasize to stay off of them for headache and migraine management    We can consider Vyepti if HAs migraines do not stabilize or possibly even Botox  Orders:  -     ubrogepant (UBRELVY) 100 mg tablet; Take 1 Tablet by mouth daily as needed for Migraine.  Indications: a migraine headache, Normal, Disp-16 Tablet, R-12  -     gabapentin (NEURONTIN) 100 mg capsule; 1 to 2 tablets 3 times daily as needed headache  Indications: neuropathic pain, Normal, Disp-180 Capsule, R-2  2. Stress at home  Assessment & Plan:   Presently under significant family stress and crisis  Patient does have an appointment with mental health next week    I encourage patient to keep that appointment and have also suggested some behavioral interventions to help down regulate her stress and anxiety    This is certainly contributory to patient's uptick in headaches and migraines  3. Primary insomnia  Assessment & Plan:   She is generally done well on trazodone  She is to continue on that presently      Patient and/or family was given time to ask questions and voice concerns. I believe all questions concerns were adequately addressed at this  office visit. Patient and/or family also verbalized agreement and understanding of the above-stated plan    Patient remains a complex patient secondary to polypharmacy, significant comorbid conditions, and use of high-risk medications which complicate the decision making process related to patient's neurologic diagnosis            ICD-10-CM ICD-9-CM    1. Common migraine with intractable migraine  G43.019 346.11 ubrogepant (UBRELVY) 100 mg tablet      gabapentin (NEURONTIN) 100 mg capsule      2. Stress at home  F43.9 V61.9       3. Primary insomnia  F51.01 307.42                 I attest that 40 minutes was spent on today's visit reviewing medical records and diagnostic testing deemed pertinent to this patient's care, along with direct time spent at patient's visit including the history, physical assessment and plan, discussing diagnosis and management along with documentation.       HPI  Historical Data  Patient is known to me from my prior practice  Neurologic diagnosis  chronic refractory headaches and migraines     Migraine characteristics  Location: Generally right-sided  Quality: Pounding and throbbing  Intensity: Varies anywhere between mild and incapacitating  Duration: Each migraine can last up to several days  Associated symptoms: Light and sound sensitivity along with nausea and vomiting      Prior therapies tried:. Topamax. Norpramin. Elavil. Bromocriptine [forcatamenial migraines: no longer an issue]. gabapentin. Wellbutrin. lexapro. Aimovig 70 [initial dose September 11, 2018] & 140mg doses  Emgality  Ajovy     Abortive meds tried in the past:.   Darvocet [good relief but taken off the market]. Midrin. [Good results taken off the market]  migranol NS. Medrol dose pack. Norco  Maxalt  Imitrex injectable        Insomnia. in past used Ambien but now good results w trazodone. In the past she saw Dr. Stephan Scott for severe neck pain: receiving injections and PT has gotten sustained relief just using Amrix prn. Off Lyrica. Amrix worked well in the past but not having to use any more as this is no longer an issue. Is no longer being followed by Dr. Stephan Scott.      Other  Retired August 2021    Interim Data:      HAs migraines  Patient continues with chronic refractory headaches and migraines  Patient has been using CGRP therapy since 2020  She is now having 10-15 headache/migraine free days per month    Additionally they are not as severe when she gets them  There is less use of rescue medication    She does use a stratified protocol between Maxalt and Imitrex injection and Norco [which is being prescribed by pain management]    OV 9/8/2022: Patient has had an uptick in headaches and migraines but there have been some significant family issues to include her son being arrested as well as attempting to commit suicide; additionally pain management is no longer prescribing her Norco and she has been out of that for quite some time now       Insomnia   well controlled with the use of trazodone      Pertinent diagnostic testing.   7/30/15  MRI of the cervical spine with and without contrast.   Multilevel facet hypertrophy with left greater than right however without significant foraminal stenosis. Small left central disk extrusion at C6 -7 with relatively minimal spinal canal narrowing. MRI the head with and without contrast.   Brain is essentially normal with the exception of mild cortical volume loss. No results found for this or any previous visit. Allergies   Allergen Reactions    Nurtec Odt [Rimegepant] Nausea Only    Escitalopram Other (comments)     HEADACHE, GI UPSET    Nsaids (Non-Steroidal Anti-Inflammatory Drug) Other (comments)     Causes acid reflux    Topamax [Topiramate] Other (comments)     Confusion         Current Outpatient Medications   Medication Sig    venlafaxine-SR (EFFEXOR-XR) 37.5 mg capsule TAKE 1 CAPSULE BY MOUTH EVERY DAY WITH FOOD    ubrogepant (UBRELVY) 100 mg tablet Take 1 Tablet by mouth daily as needed for Migraine. Indications: a migraine headache    gabapentin (NEURONTIN) 100 mg capsule 1 to 2 tablets 3 times daily as needed headache  Indications: neuropathic pain    rizatriptan (MAXALT) 10 mg tablet 1 TABLET AT ONSET OF MIGRAINE MAY REPEAT EVERY 2 HOURS AS NEEDED NOT TO EXCEED A TOTAL OF 3 DOSES IN 24 HOURS INDICATIONS: A MIGRAINE HEADACHE    SUMAtriptan (IMITREX) 6 mg/0.5 mL injection 0.5 mL by SubCUTAneous route every one (1) hour as needed for PRN Reason (Other) (Severe migraine) for up to 6 doses. Indications: a migraine headache    Ajovy Autoinjector 225 mg/1.5 mL auto-injector 4.5 ML BY SUBCUTANEOUS ROUTE EVERY THREE (3) MONTHS. traZODone (DESYREL) 150 mg tablet TAKE 1 TABLET BY MOUTH EVERY DAY AT NIGHT    zolpidem (AMBIEN) 5 mg tablet Take 1 Tab by mouth nightly as needed for Sleep. Max Daily Amount: 5 mg.    sertraline (Zoloft) 25 mg tablet Take 1 Tab by mouth daily. levothyroxine (SYNTHROID) 88 mcg tablet Take 1 Tab by mouth Daily (before breakfast). pantoprazole (PROTONIX) 40 mg tablet Take 1 Tab by mouth daily. pseudoephedrine (SUDAFED) 30 mg tablet Take  by mouth every four (4) hours as needed for Congestion.     meclizine (ANTIVERT) 25 mg tablet Take  by mouth three (3) times daily as needed. estradiol-norethindrone (ACTIVELLA) 0.5-0.1 mg per tablet Take 1 Tab by mouth daily. PSEUDOEPHEDRINE-guaiFENesin (MUCINEX D)  mg per tablet Take 1 Tab by mouth every twelve (12) hours. No current facility-administered medications for this visit. Past medical history/surgical history, family history, and social history have been reviewed for today's visit      ROS    A ten system review of constitutional, cardiovascular, respiratory, musculoskeletal, endocrine, skin, SHEENT, genitourinary, psychiatric and neurologic systems was obtained and is unremarkable except as mentioned under HPI          EXAMINATION: Within the context of virtual telehealth visit:    General appearance: Patient is well-developed and well-nourished in no apparent distress and well groomed. Psych/mental health:  Affect: Appropriate    PHQ  3 most recent PHQ Screens 9/8/2022   PHQ Not Done -   Little interest or pleasure in doing things Not at all   Feeling down, depressed, irritable, or hopeless Not at all   Total Score PHQ 2 0       HEENT:   WNL      Cardiovascular: WNL    Respiratory:   WNL    Musculoskeletal:   WNL    Integumentary:    WNL      Neurological Examination:   Mental Status:        MMSE  No flowsheet data found. Formal testing was not completed    there was nothing concerning on general observation and discussion.    Alert oriented and appropriate to general conversation  Normal processing on general observation  Followed conversation and responded seemingly appropriate throughout the office visit  No word finding difficulties noted on casual observation  Able to follow directions without difficulty     Cranial Nerves:    Grossly intact    Motor:   Normal bulk  No tremor appreciated on today's exam  No abnormal movements appreciated on today's exam  Moves extremities spontaneously and with purpose      Sensation: Not tested    Coordination/Cerebellar:   Grossly intact    Gait: Not tested    Fall risk assessment  Fall Risk Assessment, last 12 mths 8/21/2019   Able to walk? Yes   Fall in past 12 months? Yes   Number of falls in past 12 months 1   Fall with injury? 1           Due to this being a TeleHealth evaluation, many elements of the physical examination are unable to be assessed. Pursuant to the emergency declaration under the [address] [place] [address] waiver authority and the [employer] Act, this Virtual  Visit was conducted, with patient's consent, to reduce the patient's risk of exposure to COVID-19 and provide continuity of care for an established patient. Services were provided through a video synchronous discussion virtually to substitute for in-person clinic visit.           Choco Og MS, ANP-BC, [facility]

## 2022-09-08 NOTE — ASSESSMENT & PLAN NOTE
Presently under significant family stress and crisis  Patient does have an appointment with mental health next week    I encourage patient to keep that appointment and have also suggested some behavioral interventions to help down regulate her stress and anxiety    This is certainly contributory to patient's uptick in headaches and migraines

## 2022-09-08 NOTE — ASSESSMENT & PLAN NOTE
In general, since starting CGRP there is been a reduction in frequency intensity and duration as well as a reduction in rescue medication utilization and a reduction in this time at work/personal life    She has had about a 30% reduction in her headaches and migraines total but intensity and duration have improved with a reduction in rescue medication and improved quality of life    More recently due to family crisis HAs migraines have escalated    She is to continue on Ajovy presently  Will prescribe Ubrelvy 100 mg number 16 tablets to use for rescue for more moderate to severe migraines first-line with backup of rizatriptan    For the more mild to moderate headaches I prescribed gabapentin  Gabapentin 100 mg 1 to 2 tablets up to 3  times a day as needed for breakthrough headache pain    Patient does not use OTCs but I did emphasize to stay off of them for headache and migraine management    We can consider Vyepti if HAs migraines do not stabilize or possibly even Botox

## 2022-09-08 NOTE — PATIENT INSTRUCTIONS
As per discussion    For right now I want to keep you on the [employer] Vandana  Work on adding [employer] allows it] 100 mg number 16 tablets if that gets approved take it every other day and once or HAs down regulate back to baseline then you can use it as needed    In addition I prescribed gabapentin  Gabapentin 100 mg 1 to 2 tablets up to 3  times a day as needed for breakthrough headache pain  And then use the rizatriptan also known as Maxalt for the more severe headaches and migraines and or Imitrex    Stay off of all over-the-counter medications for headache and migraine    MINDFULNESS and WELLNESS  focus on several things:  Mindfulness: be in the moment  Restfulness: Look at apps for meditation and white noise to help you relax and to sleep better  Mild exercise: Try to walk 10 minutes 3 times a week. At those times listen to music that may be restful for you or relaxing use your apps or perhaps podcasts  Consider adult coloring books to help reduce anxiety and improve focus    I do advocate for the meditation type of yoga at least 10 to 15 minutes each day or several times a week you can find plenty of videos on YouTube that are free     If appropriate, work with a mental health team closely to help walk you through the issues that you are struggling with to get to a better place     Be excepting of where you are right now instead of trying to fight to where you used to be. Focus on who you want to become going forward. And I do agree with you seeing your mental health counselor  I am truly sorry for what you are going through it cannot be easy now that we will keep you on our thoughts and prayers here [address] [place] [address]   Please make sure that you arrive for your next appointment at least 15 minutes prior to your appointment time.     If for some reason you are going to be late please notify the office to determine if you need to be rescheduled or we can adjust your appointment time      Phone calls/patient messages:  Please allow up to 24 hours for someone in the office to contact you about your call or message. Be mindful your provider may be out of the office or your message may require further review. We encourage you to use [employer] for your messages as this is a faster, more efficient way to communicate with our office    Medication Refills:  Prescription medications require up to 48 business hours to process. We encourage you to use [employer] for your refills. For controlled medications: Please allow up to 72 business hours to process. Certain medications may require you to  a written prescription at our office. NO narcotic/controlled medications will be prescribed after 4pm Monday through Friday or on weekends    Form/Paperwork Completion:  We ask that you allow 7-14 business days. You may also download your forms to [employer] to have your doctor print off.

## 2022-09-09 ENCOUNTER — TELEPHONE (OUTPATIENT)
Dept: NEUROLOGY | Age: 62
End: 2022-09-09

## 2022-11-26 DIAGNOSIS — G43.019 COMMON MIGRAINE WITH INTRACTABLE MIGRAINE: ICD-10-CM

## 2022-11-28 RX ORDER — FREMANEZUMAB-VFRM 225 MG/1.5ML
INJECTION SUBCUTANEOUS
Qty: 1.5 ML | Refills: 11 | Status: SHIPPED | OUTPATIENT
Start: 2022-11-28

## 2022-12-03 ENCOUNTER — TELEPHONE (OUTPATIENT)
Dept: NEUROLOGY | Age: 62
End: 2022-12-03

## 2022-12-03 NOTE — TELEPHONE ENCOUNTER
RE:ubrelvy 100 mg tablets approved   Approved on September 9  CaseId:[id number];Status:Approved; Review Type:Prior Auth; Coverage Start Date:09/09/2022; Coverage End Date:09/09/2023;;CaseId:[id number];Status:Cancelled; Explanation:[place] not Required. The Prescribed limit is below the plan limit;

## 2022-12-08 NOTE — TELEPHONE ENCOUNTER
Brianna Cruz approval details from the 12/3 documentation to her pharmacy [facility] in [place] via Right Fax today.

## 2022-12-12 ENCOUNTER — VIRTUAL VISIT (OUTPATIENT)
Dept: NEUROLOGY | Age: 62
End: 2022-12-12
Payer: COMMERCIAL

## 2022-12-12 DIAGNOSIS — F41.9 ANXIETY: ICD-10-CM

## 2022-12-12 DIAGNOSIS — F51.01 PRIMARY INSOMNIA: ICD-10-CM

## 2022-12-12 DIAGNOSIS — G43.019 COMMON MIGRAINE WITH INTRACTABLE MIGRAINE: Primary | ICD-10-CM

## 2022-12-12 DIAGNOSIS — F43.9 STRESS AT HOME: ICD-10-CM

## 2022-12-12 PROBLEM — M19.90 ARTHRITIS: Status: ACTIVE | Noted: 2022-04-12

## 2022-12-12 PROBLEM — M54.2 NECK PAIN: Status: ACTIVE | Noted: 2019-02-06

## 2022-12-12 PROCEDURE — 99215 OFFICE O/P EST HI 40 MIN: CPT | Performed by: PSYCHIATRY & NEUROLOGY

## 2022-12-12 RX ORDER — PROPRANOLOL HYDROCHLORIDE 120 MG/1
10 CAPSULE, EXTENDED RELEASE ORAL
COMMUNITY
End: 2022-12-12 | Stop reason: DRUGHIGH

## 2022-12-12 RX ORDER — SUMATRIPTAN 6 MG/.5ML
6 INJECTION, SOLUTION SUBCUTANEOUS
Qty: 2 EACH | Refills: 5
Start: 2022-12-12

## 2022-12-12 RX ORDER — PROPRANOLOL HYDROCHLORIDE 10 MG/1
TABLET ORAL
COMMUNITY
Start: 2022-12-01

## 2022-12-12 NOTE — ASSESSMENT & PLAN NOTE
Since starting CGRP there is been a reduction in frequency intensity and duration as well as a reduction in rescue medication utilization and a reduction in this time at work/personal life    Continue on Ajovy written is quarterly doses    Rescue medication she uses as a stratified protocol to include Ubrelvy, Maxalt, Imitrex injectable

## 2022-12-12 NOTE — ASSESSMENT & PLAN NOTE
Exacerbated by current family stressors  Continue under the care of mental health    Continue on trazodone  She has Ambien as needed as well managed by mental health

## 2022-12-12 NOTE — PATIENT INSTRUCTIONS
As per discussion    Regarding the Ajovy you can take 1 injection every 30 days or you can take all 3 injections on the same day for quarterly dosing    Regarding your rescue protocol you can use the [facility] [place] it has been approved to get 16 tablets/month so if you want to you can take it every other day to help if you are having frequent headaches  Bottom line is you have 16 tablets you can take 1 per 24 hours so find out what works best to help manage these Has      And then back up to that would be the Maxalt or the Imitrex injectable depending on severity    And I strongly encourage you to continue to work with your mental health team    I do not believe that the severity of the tremor is related to a neurologic issue I think it is more related to the anxiety and stress that you have been on during your recently entering the chronic phase of that crisis situation    I agree with the treatment of low-dose propranolol prescribed by her mental health team    Remember to take some time for yourself and to focus on the things that do bring a smile to your face every day  And anxiety starts to boil over we talked about for by 4 x 4 breathing technique  And I do agree with meditation on a routine basis there is scientific evidence for changes that occur to help down regulate anxiety reaction    Please try to have a [employer] and a happy new year      Office Policies      Appointments  Please make sure that you arrive for your next appointment at least 15 minutes prior to your appointment time. If for some reason you are going to be late please notify the office to determine if you need to be rescheduled or we can adjust your appointment time      Phone calls/patient messages:  Please allow up to 24 hours for someone in the office to contact you about your call or message. Be mindful your provider may be out of the office or your message may require further review.  We encourage you to use Dr. Tariff for your messages as this is a faster, more efficient way to communicate with our office    Medication Refills:  Prescription medications require up to 48 business hours to process. We encourage you to use [email or web address] for your refills. For controlled medications: Please allow up to 72 business hours to process. Certain medications may require you to  a written prescription at our office. NO narcotic/controlled medications will be prescribed after 4pm Monday through Friday or on weekends    Form/Paperwork Completion:  We ask that you allow 7-14 business days. You may also download your forms to [email or web address] to have your doctor print off.

## 2022-12-12 NOTE — PROGRESS NOTES
[facility]ALTH Virtual FOLLOW-UP VISIT        Bob Lord is a 58 y.o. female who was seen by synchronous (real-time) audio-video technology on 12/12/2022. Bob Lord is a 58 y.o. female who presents today for the following:  Chief Complaint   Patient presents with    Follow-up    Migraine         ASSESSMENT AND PLAN      1. Common migraine with intractable migraine  Assessment & Plan:  Since starting CGRP there is been a reduction in frequency intensity and duration as well as a reduction in rescue medication utilization and a reduction in this time at work/personal life    Continue on Ajovy written is quarterly doses    Rescue medication she uses as a stratified protocol to include Ubrelvy, Maxalt, Imitrex injectable  Orders:  -     SUMAtriptan (IMITREX) 6 mg/0.5 mL injection; 0.5 mL by SubCUTAneous route every one (1) hour as needed for PRN Reason (Other) (Severe migraine) for up to 6 doses. Indications: a migraine headache, No Print, Disp-2 Each, R-5Please dispense 2 injections  2. Primary insomnia  Assessment & Plan:   Exacerbated by current family stressors  Continue under the care of mental health    Continue on trazodone  She has Ambien as needed as well managed by mental health  3. Stress at home  Assessment & Plan:   Remains under high degree of stress but is now becoming more of a chronic pattern  She does see mental health on a regular basis I have encouraged her to continue to follow-up with her mental health team both counselor and psychiatrist    Given the state of affairs I do think her headaches are reasonably well controlled  4. Anxiety  Assessment & Plan:   I think the major cause of patient's tremor is related to her anxiety and I do agree with the use of the propranolol low-dose up to 3 times daily as needed as prescribed through mental health      Patient and/or family was given time to ask questions and voice concerns.  I believe all questions concerns were adequately addressed at this  office visit. Patient and/or family also verbalized agreement and understanding of the above-stated plan    Complex neurologic decision making secondary any or all of the following to include unclear etiology, and /or polypharmacy, and/or significant comorbid conditions, and/or use of high-risk medications which complicate the decision making process related to patient's neurologic diagnosis    Follow-up and Dispositions    Return in about 4 months (around 4/12/2023) for Virtual visit. ICD-10-CM ICD-9-CM    1. Common migraine with intractable migraine  G43.019 346.11 SUMAtriptan (IMITREX) 6 mg/0.5 mL injection      2. Primary insomnia  F51.01 307.42       3. Stress at home  F43.9 V61.9       4. Anxiety  F41.9 300.00                   I attest that 40 minutes was spent on today's visit reviewing medical records and diagnostic testing deemed pertinent to this patient's care, along with direct time spent at patient's visit including the history, physical assessment and plan, discussing diagnosis and management along with documentation. HPI  Historical Data  Patient is known to me from my prior practice  Neurologic diagnosis  chronic refractory headaches and migraines     Migraine characteristics  Location: Generally right-sided  Quality: Pounding and throbbing  Intensity: Varies anywhere between mild and incapacitating  Duration: Each migraine can last up to several days  Associated symptoms: Light and sound sensitivity along with nausea and vomiting      Prior therapies tried:. Topamax. Norpramin. Elavil. Bromocriptine [forcatamenial migraines: no longer an issue]. gabapentin. Wellbutrin. lexapro. Aimovig 70 [initial dose September 11, 2018] & 140mg doses  Emgality  Ajovy     Abortive meds tried in the past:.   Darvocet [good relief but taken off the market]. Midrin. [Good results taken off the market]  migranol NS.    Medrol dose pack. Norco  Maxalt  Imitrex injectable        Insomnia. in past used Ambien but now good results w trazodone. In the past she saw Dr. Richie Poole for severe neck pain: receiving injections and PT has gotten sustained relief just using Amrix prn. Off Lyrica. Amrix worked well in the past but not having to use any more as this is no longer an issue. Is no longer being followed by Dr. Richie Poole.      Other  Retired August 2021    Interim Data:      HAs migraines  Patient continues with chronic refractory headaches and migraines  Patient has been using CGRP therapy since 2020  She is now having 10-15 headache/migraine free days per month    Additionally they are not as severe when she gets them  There is less use of rescue medication    She does use a stratified protocol between Maxalt and Imitrex injection and Norco [which is being prescribed by pain management]    OV 9/8/2022: Patient has had an uptick in headaches and migraines but there have been some significant family issues to include her son being arrested as well as attempting to commit suicide; additionally pain management is no longer prescribing her Norco and she has been out of that for quite some time now    OV 12/12/22: Patient still dealing with increased HAs secondary to home situation w/ some serious legal issues with her son  She continues with the Ajovy as preventative and has been approved for the Ubrelvy as abortive rescue medication and she also has Maxalt and Imitrex injectable that she uses as a stratified protocol based on severity       Insomnia   Generally well controlled on Trazodone but also has Ambien and this is managed through her mental health team  Ambien was added secondary to her more recent stress issues she states she does not use it that often    Tremor  New complaint this office visit states its not just her hands and arms but sometimes her whole body mental health is given her prescription for propranolol 10 mg 3 times a day as needed for tremor she finds that she is full    Pertinent diagnostic testing.   7/30/15  MRI of the cervical spine with and without contrast.   Multilevel facet hypertrophy with left greater than right however without significant foraminal stenosis. Small left central disk extrusion at C6 -7 with relatively minimal spinal canal narrowing. MRI the head with and without contrast.   Brain is essentially normal with the exception of mild cortical volume loss. No results found for this or any previous visit. Allergies   Allergen Reactions    Nurtec Odt [Rimegepant] Nausea Only    Escitalopram Other (comments)     HEADACHE, GI UPSET    Nsaids (Non-Steroidal Anti-Inflammatory Drug) Other (comments)     Causes acid reflux    Topamax [Topiramate] Other (comments)     Confusion         Current Outpatient Medications   Medication Sig    SUMAtriptan (IMITREX) 6 mg/0.5 mL injection 0.5 mL by SubCUTAneous route every one (1) hour as needed for PRN Reason (Other) (Severe migraine) for up to 6 doses. Indications: a migraine headache    Ajovy Autoinjector 225 mg/1.5 mL auto-injector 4.5 ML BY SUBCUTANEOUS ROUTE EVERY THREE (3) MONTHS. venlafaxine-SR (EFFEXOR-XR) 37.5 mg capsule TAKE 1 CAPSULE BY MOUTH EVERY DAY WITH FOOD    ubrogepant (UBRELVY) 100 mg tablet Take 1 Tablet by mouth daily as needed for Migraine. Indications: a migraine headache    gabapentin (NEURONTIN) 100 mg capsule 1 to 2 tablets 3 times daily as needed headache  Indications: neuropathic pain    rizatriptan (MAXALT) 10 mg tablet 1 TABLET AT ONSET OF MIGRAINE MAY REPEAT EVERY 2 HOURS AS NEEDED NOT TO EXCEED A TOTAL OF 3 DOSES IN 24 HOURS INDICATIONS: A MIGRAINE HEADACHE    traZODone (DESYREL) 150 mg tablet TAKE 1 TABLET BY MOUTH EVERY DAY AT NIGHT    zolpidem (AMBIEN) 5 mg tablet Take 1 Tab by mouth nightly as needed for Sleep. Max Daily Amount: 5 mg.    sertraline (Zoloft) 25 mg tablet Take 1 Tab by mouth daily.     levothyroxine (SYNTHROID) 88 mcg tablet Take 1 Tab by mouth Daily (before breakfast). pantoprazole (PROTONIX) 40 mg tablet Take 1 Tab by mouth daily. pseudoephedrine (SUDAFED) 30 mg tablet Take  by mouth every four (4) hours as needed for Congestion. meclizine (ANTIVERT) 25 mg tablet Take  by mouth three (3) times daily as needed. estradiol-norethindrone (ACTIVELLA) 0.5-0.1 mg per tablet Take 1 Tab by mouth daily. PSEUDOEPHEDRINE-guaiFENesin (MUCINEX D)  mg per tablet Take 1 Tablet by mouth every twelve (12) hours as needed. propranoloL (INDERAL) 10 mg tablet TAKE 1 TABLET BY MOUTH THREE TIMES A DAY AS NEEDED FOR ANXIETY AND TREMOR. No current facility-administered medications for this visit. Past medical history/surgical history, family history, and social history have been reviewed for today's visit      ROS    A ten system review of constitutional, cardiovascular, respiratory, musculoskeletal, endocrine, skin, SHEENT, genitourinary, psychiatric and neurologic systems was obtained and is unremarkable except as mentioned under HPI          EXAMINATION: Within the context of virtual telehealth visit:    General appearance: Patient is well-developed and well-nourished in no apparent distress and well groomed. Psych/mental health:  Affect: Appropriate    PHQ  3 most recent PHQ Screens 9/8/2022   PHQ Not Done -   Little interest or pleasure in doing things Not at all   Feeling down, depressed, irritable, or hopeless Not at all   Total Score PHQ 2 0       HEENT:   WNL      Cardiovascular: WNL    Respiratory:   WNL    Musculoskeletal:   WNL    Integumentary:    WNL      Neurological Examination:   Mental Status:        MMSE  No flowsheet data found. Formal testing was not completed    there was nothing concerning on general observation and discussion.    Alert oriented and appropriate to general conversation  Normal processing on general observation  Followed conversation and responded seemingly appropriate throughout the office visit  No word finding difficulties noted on casual observation  Able to follow directions without difficulty     Cranial Nerves:    Grossly intact    Motor:   Normal bulk  No tremor appreciated on today's exam  No abnormal movements appreciated on today's exam  Moves extremities spontaneously and with purpose      Sensation: Not tested    Coordination/Cerebellar:   Grossly intact    Gait: Not tested    Fall risk assessment  Fall Risk Assessment, last 12 mths 8/21/2019   Able to walk? Yes   Fall in past 12 months? Yes   Number of falls in past 12 months 1   Fall with injury? 1           Due to this being a TeleHealth evaluation, many elements of the physical examination are unable to be assessed. Pursuant to the emergency declaration under the [facility], [facility] waiver authority and the [employer] and [employer] Act, this Virtual  Visit was conducted, with patient's consent, to reduce the patient's risk of exposure to COVID-19 and provide continuity of care for an established patient. Services were provided through a video synchronous discussion virtually to substitute for in-person clinic visit.           Jose Casiano MS, ANP-BC, [facility]

## 2022-12-12 NOTE — ASSESSMENT & PLAN NOTE
Remains under high degree of stress but is now becoming more of a chronic pattern  She does see mental health on a regular basis I have encouraged her to continue to follow-up with her mental health team both counselor and psychiatrist    Given the state of affairs I do think her headaches are reasonably well controlled

## 2022-12-12 NOTE — ASSESSMENT & PLAN NOTE
I think the major cause of patient's tremor is related to her anxiety and I do agree with the use of the propranolol low-dose up to 3 times daily as needed as prescribed through mental health

## 2023-03-13 ENCOUNTER — VIRTUAL VISIT (OUTPATIENT)
Dept: NEUROLOGY | Age: 63
End: 2023-03-13
Payer: COMMERCIAL

## 2023-03-13 DIAGNOSIS — F41.9 ANXIETY: ICD-10-CM

## 2023-03-13 DIAGNOSIS — G43.019 COMMON MIGRAINE WITH INTRACTABLE MIGRAINE: Primary | ICD-10-CM

## 2023-03-13 DIAGNOSIS — F43.9 STRESS AT HOME: ICD-10-CM

## 2023-03-13 DIAGNOSIS — F51.01 PRIMARY INSOMNIA: ICD-10-CM

## 2023-03-13 PROCEDURE — 99214 OFFICE O/P EST MOD 30 MIN: CPT | Performed by: PSYCHIATRY & NEUROLOGY

## 2023-03-13 RX ORDER — AZELASTINE HYDROCHLORIDE 0.5 MG/ML
SOLUTION/ DROPS OPHTHALMIC
COMMUNITY
Start: 2023-02-15

## 2023-03-13 RX ORDER — RIZATRIPTAN BENZOATE 10 MG/1
TABLET ORAL
Qty: 9 TABLET | Refills: 11 | Status: SHIPPED | OUTPATIENT
Start: 2023-03-13 | End: 2023-03-17 | Stop reason: SDUPTHER

## 2023-03-13 RX ORDER — CLONAZEPAM 0.5 MG/1
TABLET ORAL
COMMUNITY
Start: 2022-12-30

## 2023-03-13 NOTE — ASSESSMENT & PLAN NOTE
Since starting CGRP there is been a reduction in frequency intensity and duration as well as a reduction in rescue medication utilization and a reduction in this time at work/personal life  Continue on Ajovy quarterly dosing   I have asked prior authorization team to work on the new prior authorization since she has new insurance    Additionally she is on low-dose Effexor as well as propranolol for other reasons but can still be beneficial in this domain    For rescue she is to continue on rizatriptan and in the past she also uses [facility] [place] with excellent results however once again we will need to get prior authorization for the Ubrelvy since she has no insurance

## 2023-03-13 NOTE — PROGRESS NOTES
[address] 83  TELEHEALTH Virtual FOLLOW-UP VISIT        Zhane Smith is a 58 y.o. female who was seen by synchronous (real-time) audio-video technology on 3/13/2023. Zhane Smith is a 58 y.o. female who presents today for the following:  Chief Complaint   Patient presents with    Follow-up     States that she is being seen for migraines but is having a lot of stress which is not helping but doing ok. Can do about 30 min early         ASSESSMENT AND PLAN      1. Common migraine with intractable migraine  Assessment & Plan:  Since starting CGRP there is been a reduction in frequency intensity and duration as well as a reduction in rescue medication utilization and a reduction in this time at work/personal life  Continue on Ajovy quarterly dosing   I have asked prior authorization team to work on the new prior authorization since she has new insurance    Additionally she is on low-dose Effexor as well as propranolol for other reasons but can still be beneficial in this domain    For rescue she is to continue on rizatriptan and in the past she also uses [facility] [place] with excellent results however once again we will need to get prior authorization for the Ubrelvy since she has no insurance        Orders:  -     rizatriptan (MAXALT) 10 mg tablet; May repeat in 2 hours if needed  Indications: a migraine headache, Normal, Disp-9 Tablet, R-11  2. Primary insomnia  Assessment & Plan:   Magnified due to current family stressors continue under the care of mental health she continues on trazodone and Ambien which is managed by the mental health team  3. Stress at home  Assessment & Plan:   Continues under high degree stress which is more of a chronic pattern continues with mental health regularly and we talked about day-to-day coping strategies  4.  Anxiety  Assessment & Plan:   Continues under care of mental health and certainly magnifies headaches and tremor      Patient and/or family was given time to ask questions and voice concerns. I believe all questions concerns were adequately addressed at this  office visit. Patient and/or family also verbalized agreement and understanding of the above-stated plan    Complex neurologic decision making secondary any or all of the following to include unclear etiology, and /or polypharmacy, and/or significant comorbid conditions, and/or use of high-risk medications which complicate the decision making process related to patient's neurologic diagnosis    Follow-up and Dispositions    Return in about 6 months (around 9/13/2023) for Virtual visit. ICD-10-CM ICD-9-CM    1. Common migraine with intractable migraine  G43.019 346.11 rizatriptan (MAXALT) 10 mg tablet      2. Primary insomnia  F51.01 307.42       3. Stress at home  F43.9 V61.9       4. Anxiety  F41.9 300.00             HPI  Historical Data  Patient is known to me from my prior practice  Neurologic diagnosis  chronic refractory headaches and migraines     Migraine characteristics  Location: Generally right-sided  Quality: Pounding and throbbing  Intensity: Varies anywhere between mild and incapacitating  Duration: Each migraine can last up to several days  Associated symptoms: Light and sound sensitivity along with nausea and vomiting      Prior therapies tried:. Topamax. Norpramin. Elavil. Bromocriptine [forcatamenial migraines: no longer an issue]. gabapentin. Wellbutrin. lexapro. Aimovig 70 [initial dose September 11, 2018] & 140mg doses  Emgality  Ajovy     Abortive meds tried in the past:.   Darvocet [good relief but taken off the market]. Midrin. [Good results taken off the market]  migranol NS. Medrol dose pack. Norco  Maxalt  Imitrex injectable        Insomnia. in past used Ambien but now good results w trazodone.       In the past she saw Dr. Tory Goldman for severe neck pain: receiving injections and PT has gotten sustained relief just using Amrix prn.   Off Lyrica. Amrix worked well in the past but not having to use any more as this is no longer an issue. Is no longer being followed by Dr. Deni Goldberg.      Other  Retired August 2021    Interim Data:      HAs migraines  Patient continues with chronic refractory headaches and migraines  Patient has been using CGRP therapy since 2020  She is now having 10-15 headache/migraine free days per month    Additionally they are not as severe when she gets them  There is less use of rescue medication    She does use a stratified protocol between Maxalt and Imitrex injection and Norco [which is being prescribed by pain management]    OV 9/8/2022: Patient has had an uptick in headaches and migraines but there have been some significant family issues to include her son being arrested as well as attempting to commit suicide; additionally pain management is no longer prescribing her Norco and she has been out of that for quite some time now    OV 12/12/22: Patient still dealing with increased HAs secondary to home situation w/ some serious legal issues with her son  She continues with the Ajovy as preventative and has been approved for the Ubrelvy as abortive rescue medication and she also has Maxalt and Imitrex injectable that she uses as a stratified protocol based on severity       Insomnia   Generally well controlled on Trazodone but also has Ambien and this is managed through her mental health team  Ambien was added secondary to her more recent stress issues she states she does not use it that often    Tremor  New complaint this office visit states its not just her hands and arms but sometimes her whole body mental health is given her prescription for propranolol 10 mg 3 times a day as needed for tremor she finds that she is full          Pertinent diagnostic testing.   7/30/15  MRI of the cervical spine with and without contrast.   Multilevel facet hypertrophy with left greater than right however without significant foraminal stenosis. Small left central disk extrusion at C6 -7 with relatively minimal spinal canal narrowing. MRI the head with and without contrast.   Brain is essentially normal with the exception of mild cortical volume loss. No results found for this or any previous visit. Allergies   Allergen Reactions    Nurtec Odt [Rimegepant] Nausea Only    Escitalopram Other (comments)     HEADACHE, GI UPSET    Nsaids (Non-Steroidal Anti-Inflammatory Drug) Other (comments)     Causes acid reflux    Topamax [Topiramate] Other (comments)     Confusion         Current Outpatient Medications   Medication Sig    azelastine (OPTIVAR) 0.05 % ophthalmic solution INSTILL 1 DROP INTO AFFECTED EYE TWICE A DAY    clonazePAM (KlonoPIN) 0.5 mg tablet PLEASE SEE ATTACHED FOR DETAILED DIRECTIONS    rizatriptan (MAXALT) 10 mg tablet May repeat in 2 hours if needed  Indications: a migraine headache    propranoloL (INDERAL) 10 mg tablet TAKE 1 TABLET BY MOUTH THREE TIMES A DAY AS NEEDED FOR ANXIETY AND TREMOR. SUMAtriptan (IMITREX) 6 mg/0.5 mL injection 0.5 mL by SubCUTAneous route every one (1) hour as needed for PRN Reason (Other) (Severe migraine) for up to 6 doses. Indications: a migraine headache    Ajovy Autoinjector 225 mg/1.5 mL auto-injector 4.5 ML BY SUBCUTANEOUS ROUTE EVERY THREE (3) MONTHS. venlafaxine-SR (EFFEXOR-XR) 37.5 mg capsule TAKE 1 CAPSULE BY MOUTH EVERY DAY WITH FOOD    gabapentin (NEURONTIN) 100 mg capsule 1 to 2 tablets 3 times daily as needed headache  Indications: neuropathic pain    traZODone (DESYREL) 150 mg tablet TAKE 1 TABLET BY MOUTH EVERY DAY AT NIGHT    zolpidem (AMBIEN) 5 mg tablet Take 1 Tab by mouth nightly as needed for Sleep. Max Daily Amount: 5 mg.    levothyroxine (SYNTHROID) 88 mcg tablet Take 1 Tab by mouth Daily (before breakfast). pantoprazole (PROTONIX) 40 mg tablet Take 1 Tab by mouth daily.     pseudoephedrine (SUDAFED) 30 mg tablet Take  by mouth every four (4) hours as needed for Congestion. meclizine (ANTIVERT) 25 mg tablet Take  by mouth three (3) times daily as needed. estradiol-norethindrone (ACTIVELLA) 0.5-0.1 mg per tablet Take 1 Tab by mouth daily. PSEUDOEPHEDRINE-guaiFENesin (MUCINEX D)  mg per tablet Take 1 Tablet by mouth every twelve (12) hours as needed. ubrogepant (UBRELVY) 100 mg tablet Take 1 Tablet by mouth daily as needed for Migraine. Indications: a migraine headache (Patient not taking: Reported on 3/13/2023)     No current facility-administered medications for this visit. Past medical history/surgical history, family history, and social history have been reviewed for today's visit      ROS    A ten system review of constitutional, cardiovascular, respiratory, musculoskeletal, endocrine, skin, SHEENT, genitourinary, psychiatric and neurologic systems was obtained and is unremarkable except as mentioned under HPI          EXAMINATION: Within the context of virtual telehealth visit:    General appearance: Patient is well-developed and well-nourished in no apparent distress and well groomed. Psych/mental health:  Affect: Appropriate    PHQ  3 most recent PHQ Screens 3/13/2023   PHQ Not Done -   Little interest or pleasure in doing things Not at all   Feeling down, depressed, irritable, or hopeless Not at all   Total Score PHQ 2 0       HEENT:   WNL      Cardiovascular: WNL    Respiratory:   WNL    Musculoskeletal:   WNL    Integumentary:    WNL      Neurological Examination:   Mental Status:        MMSE  No flowsheet data found. Formal testing was not completed    there was nothing concerning on general observation and discussion.    Alert oriented and appropriate to general conversation  Normal processing on general observation  Followed conversation and responded seemingly appropriate throughout the office visit  No word finding difficulties noted on casual observation  Able to follow directions without difficulty     Cranial Nerves:    Grossly intact    Motor:   Normal bulk  No tremor appreciated on today's exam  No abnormal movements appreciated on today's exam  Moves extremities spontaneously and with purpose      Sensation: Not tested    Coordination/Cerebellar:   Grossly intact    Gait: Not tested    Fall risk assessment  Fall Risk Assessment, last 12 mths 8/21/2019   Able to walk? Yes   Fall in past 12 months? Yes   Number of falls in past 12 months 1   Fall with injury? 1           Due to this being a TeleHealth evaluation, many elements of the physical examination are unable to be assessed. Pursuant to the emergency declaration under the [address] [place] [address], [facility] waiver authority and the "[employer] and [employer] Act, this Virtual  Visit was conducted, with patient's consent, to reduce the patient's risk of exposure to COVID-19 and provide continuity of care for an established patient. Services were provided through a video synchronous discussion virtually to substitute for in-person clinic visit.           Karla Kulkarni, MS, ANP-BC, [facility]

## 2023-03-13 NOTE — PATIENT INSTRUCTIONS
As per discussion    I think you are doing as best you can and any circumstances    I have asked our prior Auth team to look into the Ajovy prior authorization issue as well as Martha Riley. And as we talked about if we need to switch products I have no objection to that they are all about the same    Continue to hang in there continue with your mental health team and continue to do things for your own wellbeing and health      Office Policies      Appointments  Please make sure that you arrive for your next appointment at least 15 minutes prior to your appointment time. If for some reason you are going to be late please notify the office to determine if you need to be rescheduled or we can adjust your appointment time      Phone calls/patient messages:  Please allow up to 24 hours for someone in the office to contact you about your call or message. Be mindful your provider may be out of the office or your message may require further review. We encourage you to use [employer] for your messages as this is a faster, more efficient way to communicate with our office    Medication Refills:  Prescription medications require up to 48 business hours to process. We encourage you to use [employer] for your refills. For controlled medications: Please allow up to 72 business hours to process. Certain medications may require you to  a written prescription at our office. NO narcotic/controlled medications will be prescribed after 4pm Monday through Friday or on weekends    Form/Paperwork Completion:  We ask that you allow 7-14 business days. You may also download your forms to [employer] to have your doctor print off.

## 2023-03-13 NOTE — ASSESSMENT & PLAN NOTE
Magnified due to current family stressors continue under the care of mental health she continues on trazodone and Ambien which is managed by the mental health team

## 2023-03-13 NOTE — ASSESSMENT & PLAN NOTE
Continues under high degree stress which is more of a chronic pattern continues with mental health regularly and we talked about day-to-day coping strategies

## 2023-03-17 DIAGNOSIS — G43.019 COMMON MIGRAINE WITH INTRACTABLE MIGRAINE: ICD-10-CM

## 2023-03-17 RX ORDER — RIZATRIPTAN BENZOATE 10 MG/1
TABLET ORAL
Qty: 9 TABLET | Refills: 11 | Status: SHIPPED | OUTPATIENT
Start: 2023-03-17

## 2023-03-17 NOTE — TELEPHONE ENCOUNTER
Pharmacy sent fax requesting clarification on patient medication rizatriptan. Only has may repeat in 2 hours if needed. Need the rest of the directions.
82

## 2023-04-03 DIAGNOSIS — G43.019 COMMON MIGRAINE WITH INTRACTABLE MIGRAINE: ICD-10-CM

## 2023-04-03 RX ORDER — RIZATRIPTAN BENZOATE 10 MG/1
TABLET ORAL
Qty: 9 TABLET | Refills: 11 | Status: SHIPPED | OUTPATIENT
Start: 2023-04-03

## 2023-04-03 NOTE — TELEPHONE ENCOUNTER
Please clarify script. Per Fax from [facility]. Rizatriptan  Original just states may repeat in two hours  Is it take one tab at onset of headache and may repeat in 2 hours?     Last office visit 3/13/2023  Last med refill 3/17/2023

## 2023-05-16 ENCOUNTER — TELEPHONE (OUTPATIENT)
Age: 63
End: 2023-05-16

## 2023-05-16 NOTE — TELEPHONE ENCOUNTER
Attempted to process prior authorization for patient today for ubrelvy. Received a error message from cover my meds,  Called patient to get updated insurance information as we don't have a copy of the patients current insurance on file.   Left voicemail for patient to return my call

## 2023-09-02 DIAGNOSIS — G43.019 MIGRAINE WITHOUT AURA, INTRACTABLE, WITHOUT STATUS MIGRAINOSUS: ICD-10-CM

## 2023-09-05 RX ORDER — RIZATRIPTAN BENZOATE 10 MG/1
TABLET ORAL
Qty: 9 TABLET | Refills: 5 | Status: SHIPPED | OUTPATIENT
Start: 2023-09-05

## 2023-11-27 DIAGNOSIS — G43.019 MIGRAINE WITHOUT AURA, INTRACTABLE, WITHOUT STATUS MIGRAINOSUS: ICD-10-CM

## 2023-11-28 RX ORDER — RIZATRIPTAN BENZOATE 10 MG/1
TABLET ORAL
Qty: 9 TABLET | Refills: 5 | OUTPATIENT
Start: 2023-11-28

## 2023-12-14 ENCOUNTER — TELEMEDICINE (OUTPATIENT)
Age: 63
End: 2023-12-14
Payer: COMMERCIAL

## 2023-12-14 DIAGNOSIS — G43.109 MIGRAINE WITH AURA AND WITHOUT STATUS MIGRAINOSUS, NOT INTRACTABLE: Primary | ICD-10-CM

## 2023-12-14 PROCEDURE — 99214 OFFICE O/P EST MOD 30 MIN: CPT | Performed by: PSYCHIATRY & NEUROLOGY

## 2023-12-14 RX ORDER — GALCANEZUMAB 120 MG/ML
120 INJECTION, SOLUTION SUBCUTANEOUS
Qty: 1 ML | Refills: 11 | Status: SHIPPED | OUTPATIENT
Start: 2023-12-14

## 2023-12-14 RX ORDER — SUMATRIPTAN 6 MG/.5ML
INJECTION, SOLUTION SUBCUTANEOUS
Qty: 0.5 ML | Refills: 5 | Status: SHIPPED | OUTPATIENT
Start: 2023-12-14

## 2023-12-14 ASSESSMENT — PATIENT HEALTH QUESTIONNAIRE - PHQ9
2. FEELING DOWN, DEPRESSED OR HOPELESS: 0
SUM OF ALL RESPONSES TO PHQ QUESTIONS 1-9: 0
1. LITTLE INTEREST OR PLEASURE IN DOING THINGS: 0
SUM OF ALL RESPONSES TO PHQ9 QUESTIONS 1 & 2: 0

## 2023-12-14 NOTE — ASSESSMENT & PLAN NOTE
Generally speaking, since starting CGRP there is been a reduction in frequency intensity and duration as well as a reduction in rescue medication utilization and a reduction in this time at work/personal life    However she has been out of the [address] [place] [address] for 6 months due to insurance barriers of noncoverage  Headaches have escalated back to chronic daily with a high degree of severity    We will reinitiate CGRP therapy. We will place her on Emgality as I suspect the Ajovy was not approved secondary to formulary preferences.   I have asked my prior authorization team to work on getting the [facility] prior authorized  And hopefully when she gets reestablished on that she will go back to improved migraine management of 50% reduction in her headaches    For rescue medication  She can continue with rizatriptan as needed  I have renewed the Imitrex injectable for the more severe migraines    She has been unable to obtain Ubrelvy unclear why but at this point I am not going to push the issue I prefer her to get back on preventative CGRP therapy and then we can look to focus on rescue CGRP therapy later    We did briefly discuss Botox as an option for preventative therapy should we continue to receive pushback regarding CGRP preventative therapy

## 2023-12-14 NOTE — PROGRESS NOTES
[address] [place] [address]  TELEHEALTH Virtual FOLLOW-UP VISIT      Fadumo Morton, was evaluated through a synchronous (real-time) audio-video encounter. The patient (or guardian if applicable) is aware that this is a billable service, which includes applicable co-pays. This Virtual Visit was conducted with patient's (and/or legal guardian's) consent. Patient identification was verified, and a caregiver was present when appropriate. The patient was located at Home: [address] [place] [address]  Provider was located at [facility] (Appt Dept): [address] [place] [address] Kim Morton is a 61 y.o.  female who presents today for the following:  Chief Complaint   Patient presents with    Follow-up     Patient states that this is 6 month and wants to talk about making a change in medication. ASSESSMENT AND PLAN  1. Migraine with aura and without status migrainosus, not intractable  Assessment & Plan:  Generally speaking, since starting CGRP there is been a reduction in frequency intensity and duration as well as a reduction in rescue medication utilization and a reduction in this time at work/personal life    However she has been out of the [address] [place] [address] for 6 months due to insurance barriers of noncoverage  Headaches have escalated back to chronic daily with a high degree of severity    We will reinitiate CGRP therapy. We will place her on Emgality as I suspect the Ajovy was not approved secondary to formulary preferences.   I have asked my prior authorization team to work on getting the [facility] prior authorized  And hopefully when she gets reestablished on that she will go back to improved migraine management of 50% reduction in her headaches    For rescue medication  She can continue with rizatriptan as needed  I have renewed the Imitrex injectable for the more severe migraines    She has been unable to obtain Ubrelvy unclear why but at this

## 2023-12-14 NOTE — PATIENT INSTRUCTIONS
As per our discussion I have renewed your CGRP therapy with Emgality 120 mg monthly. Prior authorization was requested from our PA team.  Generally speaking it can take 5-7 working days to get the PA approved. Once this is approved you can pick that up at the pharmacy and lets see how you do over the next 6 months regarding the CGRP therapy reinitiation  Goal of therapy is to have her 50% reduction in frequency of headaches and when you do get the headaches that the intensity is reduced as well. Continue with rizatriptan as needed and then you have the Imitrex injectable for severe migraine that I just renewed    We did discuss Botox and we can further discuss that at next office visit    Wishing you a very [employer] and a Happy New [place]    Phone calls/patient messages:  Please allow up to 24 hours for someone in the office to contact you about your call or message. Be mindful your provider may be out of the office or your message may require further review. We encourage you to use [employer] for your messages as this is a faster, more efficient way to communicate with our office    Medication Refills:  Prescription medications require up to 48 business hours to process. We encourage you to use [employer] for your refills. For controlled medications: Please allow up to 72 business hours to process. Certain medications may require you to  a written prescription at our office. NO narcotic/controlled medications will be prescribed after 4pm Monday through Friday or on weekends    Form/Paperwork Completion:  We ask that you allow 7-14 business days. You may also download your forms to [employer] to have your doctor print off.

## 2023-12-22 DIAGNOSIS — G43.019 MIGRAINE WITHOUT AURA, INTRACTABLE, WITHOUT STATUS MIGRAINOSUS: ICD-10-CM

## 2023-12-26 ENCOUNTER — TELEPHONE (OUTPATIENT)
Age: 63
End: 2023-12-26

## 2023-12-26 RX ORDER — FREMANEZUMAB-VFRM 225 MG/1.5ML
INJECTION SUBCUTANEOUS
Refills: 11 | OUTPATIENT
Start: 2023-12-26

## 2023-12-26 NOTE — TELEPHONE ENCOUNTER
Patient contacted us via [employer] message and asked for update for Emgality medication. \"Manjit Becker-  Your PA staff still hasn't submitted the required pre-authorization for my Emgality prescription, according to my pharmacy. Could you please remind them to do this, so I can get it refilled this month? Thank you! \"    Will forward to prior authorization specialist for more information.

## 2023-12-26 NOTE — TELEPHONE ENCOUNTER
Emgality PA sent to [facility] via [facility]  [id number]    We do not have her current insurance card on file. The system first initiated the PA with her pharmacy benefit mgr being [facility]. I used an [employer] form today specifically for [employer]. Status pending.

## 2023-12-26 NOTE — TELEPHONE ENCOUNTER
Last office visit 12/14/2023  Next office visit 11/15/2024  Last med refill patient is receiving Emgality 12/14/2023

## 2024-01-03 ENCOUNTER — TELEPHONE (OUTPATIENT)
Age: 64
End: 2024-01-03

## 2024-01-05 ENCOUNTER — TELEPHONE (OUTPATIENT)
Age: 64
End: 2024-01-05

## 2024-01-05 NOTE — TELEPHONE ENCOUNTER
Emgality approval letter received by [employer]  12/26/23 to 12/31/2039  ID [id number]    Letter uploaded to Media    Faxed auth to [facility] in [place] Fax: [phone]

## 2024-01-19 DIAGNOSIS — G43.019 MIGRAINE WITHOUT AURA, INTRACTABLE, WITHOUT STATUS MIGRAINOSUS: Primary | ICD-10-CM

## 2024-01-22 RX ORDER — FREMANEZUMAB-VFRM 225 MG/1.5ML
INJECTION SUBCUTANEOUS
Qty: 4.5 ML | Refills: 3 | Status: SHIPPED | OUTPATIENT
Start: 2024-01-22

## 2024-01-30 ENCOUNTER — TELEPHONE (OUTPATIENT)
Age: 64
End: 2024-01-30

## 2024-01-30 NOTE — TELEPHONE ENCOUNTER
Closing out original encounter that identified [facility] does not process PA's for this plan.     The Emgality was approved by [place] as noted on 1/5/24.

## 2024-02-09 DIAGNOSIS — G43.109 MIGRAINE WITH AURA AND WITHOUT STATUS MIGRAINOSUS, NOT INTRACTABLE: ICD-10-CM

## 2024-02-12 RX ORDER — SUMATRIPTAN 6 MG/.5ML
INJECTION, SOLUTION SUBCUTANEOUS
Qty: 0.5 ML | Refills: 5 | OUTPATIENT
Start: 2024-02-12

## 2024-02-12 NOTE — TELEPHONE ENCOUNTER
Last office visit 12/14/2023   Next office visit 11/15/2024  Last med refill 12/14/2023 sumatriptan has 5 refills.

## 2024-03-07 RX ORDER — UBROGEPANT 100 MG/1
TABLET ORAL
Qty: 16 TABLET | Refills: 12 | Status: SHIPPED | OUTPATIENT
Start: 2024-03-07

## 2024-04-02 ENCOUNTER — TELEPHONE (OUTPATIENT)
Age: 64
End: 2024-04-02

## 2024-04-02 NOTE — TELEPHONE ENCOUNTER
Patient had called 12/26/23 asking about Soren PA.      I got the PA for Admitly on 1/5/24.     I also see a Rx for Jazzy that was written 1/22/24. I have not worked the Ajovy since Emgality was approved and that was the Rx patient was inquiring about in December.     Nayla,     Can you confirm which one?

## 2024-04-08 ENCOUNTER — TELEPHONE (OUTPATIENT)
Age: 64
End: 2024-04-08

## 2024-04-08 NOTE — TELEPHONE ENCOUNTER
Emgality approval letter in Media on 1/5/24.     Approval letter from [employer] shows   Auth dates from 12/26/23 to 12/31/2039.     Once I was able to reach the [facility] pharmacy PA dept, rep states auth/Emgality is valid 4/15/23 to 12/31/39/ Case # [id number].     While I was on the phone, I inquired about Ubrelvy.   Rep shows Ubrelvy approved Case [id number]   12/26/23 - 12/31/39    Her plan only allows quantity of 10 per 23 days.  Anything more, must submit a quantity limit exception form. She is faxing that to me today at [phone].     Inquired about when are they going to allow electronic transmission of PA's.  Pharmacy rep stated there are no plans to do that. Must fax in paper copy.     Ins coverage in Epic shows: None    Called [employer] to verify coverage eligibility    [facility] verified the following:     effective 01/01/24   Member ID same as what we have with 01 at the end:  ID [id number].   Commercial plan  Group [id number]  (new group #)  BIN [id number]  PCN [id number]    Sent [employer] message to pt asking for copy of her card to be faxed to [phone] or upload through [employer].       Last visit on 12/13/23 was a virtual visit. Patient lives in [place].     Total time on phone:  1 hour and 10  minutes.

## 2024-06-19 ENCOUNTER — TELEPHONE (OUTPATIENT)
Age: 64
End: 2024-06-19

## 2024-06-19 ENCOUNTER — TELEMEDICINE (OUTPATIENT)
Age: 64
End: 2024-06-19
Payer: COMMERCIAL

## 2024-06-19 DIAGNOSIS — G43.019 MIGRAINE WITHOUT AURA, INTRACTABLE, WITHOUT STATUS MIGRAINOSUS: Primary | ICD-10-CM

## 2024-06-19 DIAGNOSIS — G43.109 MIGRAINE WITH AURA AND WITHOUT STATUS MIGRAINOSUS, NOT INTRACTABLE: ICD-10-CM

## 2024-06-19 PROCEDURE — 99215 OFFICE O/P EST HI 40 MIN: CPT | Performed by: PSYCHIATRY & NEUROLOGY

## 2024-06-19 RX ORDER — UBROGEPANT 100 MG/1
100 TABLET ORAL DAILY PRN
Qty: 10 TABLET | Refills: 15 | Status: SHIPPED | OUTPATIENT
Start: 2024-06-19

## 2024-06-19 ASSESSMENT — PATIENT HEALTH QUESTIONNAIRE - PHQ9
2. FEELING DOWN, DEPRESSED OR HOPELESS: NOT AT ALL
SUM OF ALL RESPONSES TO PHQ QUESTIONS 1-9: 0
1. LITTLE INTEREST OR PLEASURE IN DOING THINGS: NOT AT ALL
SUM OF ALL RESPONSES TO PHQ QUESTIONS 1-9: 0
SUM OF ALL RESPONSES TO PHQ9 QUESTIONS 1 & 2: 0
SUM OF ALL RESPONSES TO PHQ QUESTIONS 1-9: 0
SUM OF ALL RESPONSES TO PHQ QUESTIONS 1-9: 0

## 2024-06-19 NOTE — TELEPHONE ENCOUNTER
Ubrelvy -already approved, had \"Jayashree SMALL\" Run test claim - she got a paid claim today.     Emgality - last paid claim was on 6/13/24    Sumatriptan solution - does not need a PA. However, patient's last paid claim was 12/30/23.

## 2024-06-19 NOTE — ASSESSMENT & PLAN NOTE
Since starting CGRP there is been a reduction in frequency intensity and duration as well as a reduction in rescue medication utilization and a reduction in this time at work/personal life     Continue on Emgality monthly.  [Previously she was on Ajovy but we switched her to Emgality due to insurance issues]     She has multiple levels of headaches so for rescue medication she will use a stratified protocol Based on the severity to include the following medications  Rizatriptan   Imitrex injectable   Ubrelvy

## 2024-06-19 NOTE — PROGRESS NOTES
[facility]  [facility]  [address]  [place] [address]  Phone: [phone] fax: [phone]      Yasmeen Garza, was evaluated through a synchronous (real-time) audio-video encounter. The patient (or guardian if applicable) is aware that this is a billable service, which includes applicable co-pays. This Virtual Visit was conducted with patient's (and/or legal guardian's) consent. Patient identification was verified, and a caregiver was present when appropriate.   The patient was located at Home: [address] [place] [id number]  Provider was located at Facility (Appt Dept): [address]  [place] [address]  Confirm you are appropriately licensed, registered, or certified to deliver care in the state where the patient is located as indicated above. If you are not or unsure, please re-schedule the visit: Yes, I confirm.     Yasmeen Garza (:  1960) is a Established patient, presenting virtually for evaluation of the following:   Chief Complaint   Patient presents with    Follow-up     Migraines          Assessment & Plan     Below is the assessment and plan developed based on review of pertinent history, physical exam, labs, studies, and medications.  1. Migraine without aura, intractable, without status migrainosus  -     Ubrogepant (UBRELVY) 100 MG TABS; Take 100 mg by mouth daily as needed (migraine), Disp-10 tablet, R-15Rep shows Ubrelvy approved Case [id number] 23 - 39  Her plan only allows quantity of 10 per 23 days.  Anything more, must submit a quantity limit exception form.Normal  2. Migraine with aura and without status migrainosus, not intractable  Assessment & Plan:  Since starting CGRP there is been a reduction in frequency intensity and duration as well as a reduction in rescue medication utilization and a reduction in this time at work/personal life     Continue on Emgality monthly.

## 2024-06-19 NOTE — PATIENT INSTRUCTIONS
As a reminder:   Please come to your appointment 15 minutes before your office appointment.  This way, you can get checked in at the  and checked in by the nursing staff so you have the full allotment of time with your provider for your visit.  Please bring an up-to-date and accurate list of all your medications.  Or bring all your active prescription bottles with you at the time of your office visit and this includes over-the-counter medications so we can make sure that your medication list is up-to-date.  If you are scheduled for a virtual visit, please be aware that the  will need to check you in and usually the day before to verify insurance and collect co-pays as appropriate.  Please be prepared for the second call which will be from the nurse to go over your medications and any other vital information.  This will probably be done 30 minutes prior to your visit.  The reason why we do this early is that you can get the full benefit of your appointment time with your provider.  Finally you will be given the link for your virtual visit please click into your link 10 minutes prior to your appointment and please wait patiently for the provider to join you        As per discussion  I have adjusted Ubrelvy prescription to read 10 tablets instead of 16 and you can get that every 23 days.  I have asked my prior authorization specialist to check on what is going on with the Imitrex injectable  And continue with the rizatriptan    Continue with the Emgality monthly    I know you continue to struggle with the issues with your son but I am glad at least there is some level of resolution continue to do the things that help you to be in your happy place like going to the beach or the pool    Below is the information regarding the Ubrelvy prior authorization:    Ubrelvy approved Case [id number]   12/26/23 - 12/31/39     Her plan only allows quantity of 10 per 23 days.  Anything more, must submit a quantity

## 2024-07-13 DIAGNOSIS — G43.019 MIGRAINE WITHOUT AURA, INTRACTABLE, WITHOUT STATUS MIGRAINOSUS: ICD-10-CM

## 2024-07-15 RX ORDER — RIZATRIPTAN BENZOATE 10 MG/1
TABLET ORAL
Qty: 9 TABLET | Refills: 5 | Status: SHIPPED | OUTPATIENT
Start: 2024-07-15

## 2024-12-16 DIAGNOSIS — G43.109 MIGRAINE WITH AURA AND WITHOUT STATUS MIGRAINOSUS, NOT INTRACTABLE: ICD-10-CM

## 2024-12-16 RX ORDER — GALCANEZUMAB 120 MG/ML
120 INJECTION, SOLUTION SUBCUTANEOUS
Qty: 1 ML | Refills: 11 | Status: SHIPPED | OUTPATIENT
Start: 2024-12-16

## 2025-01-07 ENCOUNTER — TELEMEDICINE (OUTPATIENT)
Age: 65
End: 2025-01-07
Payer: COMMERCIAL

## 2025-01-07 DIAGNOSIS — F51.01 PRIMARY INSOMNIA: ICD-10-CM

## 2025-01-07 DIAGNOSIS — F41.9 ANXIETY: ICD-10-CM

## 2025-01-07 DIAGNOSIS — G43.109 OPHTHALMIC MIGRAINE: ICD-10-CM

## 2025-01-07 DIAGNOSIS — G43.109 MIGRAINE WITH AURA AND WITHOUT STATUS MIGRAINOSUS, NOT INTRACTABLE: Primary | ICD-10-CM

## 2025-01-07 DIAGNOSIS — G43.019 COMMON MIGRAINE WITH INTRACTABLE MIGRAINE: ICD-10-CM

## 2025-01-07 PROCEDURE — 99215 OFFICE O/P EST HI 40 MIN: CPT | Performed by: PSYCHIATRY & NEUROLOGY

## 2025-01-07 RX ORDER — SUMATRIPTAN 6 MG/.5ML
INJECTION, SOLUTION SUBCUTANEOUS
Qty: 0.5 ML | Refills: 5 | Status: SHIPPED | OUTPATIENT
Start: 2025-01-07

## 2025-01-07 NOTE — PROGRESS NOTES
[facility]  [facility]  [address]  [place] [address]  Phone: [phone] fax: [phone]      Yasmeen Garza, was evaluated through a synchronous (real-time) audio-video encounter. The patient (or guardian if applicable) is aware that this is a billable service, which includes applicable co-pays. This Virtual Visit was conducted with patient's (and/or legal guardian's) consent. Patient identification was verified, and a caregiver was present when appropriate.   The patient was located at Home: [address] [place] [id number]  Provider was located at Facility (Appt Dept): [address]  [address] [facility] [address] [place] [id number]  Confirm you are appropriately licensed, registered, or certified to deliver care in the state where the patient is located as indicated above. If you are not or unsure, please re-schedule the visit: Yes, I confirm.     Yasmeen Garza (:  1960) is a Established patient, presenting virtually for evaluation of the following:   Chief Complaint   Patient presents with    Migraine     Assessment & Plan  Chronic refractory headaches and migraines: Mixed to include ophthalmic migraine, migraine with aura, common migraine  -Since starting CGRP there is been a reduction in frequency intensity and duration as well as a reduction in rescue medication utilization and a reduction in this time at work/personal life  -She is to continue with Emgality as preventative therapy  -For rescue medication she uses a stratified protocol depending on severity to include Ubrelvy, rizatriptan, injectable sumatriptan  - Significant improvement in condition  - Reduction in intensity of migraines  - Enhanced response to abortive therapy  - Emgality injections helping, missed a dose due to personal circumstances, leading to a severe migraine, now back on Emgality  - Uses Ubrelvy and rizatriptan for moderate migraines  - Prior

## 2025-01-07 NOTE — PATIENT INSTRUCTIONS
As a reminder:   Please come to your appointment 15 minutes before your office appointment.  This way, you can get checked in at the  and checked in by the nursing staff so you have the full allotment of time with your provider for your visit.  Please bring an up-to-date and accurate list of all your medications.  Or bring all your active prescription bottles with you at the time of your office visit and this includes over-the-counter medications so we can make sure that your medication list is up-to-date.  If you are scheduled for a virtual visit, please be aware that the  will need to check you in and usually the day before to verify insurance and collect co-pays as appropriate.  Please be prepared for the second call which will be from the nurse to go over your medications and any other vital information.  This will probably be done 30 minutes prior to your visit.  The reason why we do this early is that you can get the full benefit of your appointment time with your provider.  Finally you will be given the link for your virtual visit please click into your link 10 minutes prior to your appointment and please wait patiently for the provider to join you        As per discussion  Patient Information:  Name: Yasmeen Garza  Age: 64  Medical History: Chronic refractory headaches and migraines, insomnia, stress and anxiety    Reason for Visit:  Yasmeen Garza visited for a follow-up on her chronic refractory headaches and migraines. She reported a general improvement in her condition, with persistent but mild migraines. She also discussed her history of insomnia and stress and anxiety.    Clinical Findings:  Yasmeen has been experiencing mild migraines with a decrease in intensity and an improved response to abortive therapy. She has not had any recent episodes of unusual visual disturbances or aura. She had a severe migraine after a lapse in her Emgality treatment due to her 's neck

## 2025-06-26 DIAGNOSIS — G43.019 MIGRAINE WITHOUT AURA, INTRACTABLE, WITHOUT STATUS MIGRAINOSUS: ICD-10-CM

## 2025-06-27 RX ORDER — UBROGEPANT 100 MG/1
TABLET ORAL
Qty: 10 TABLET | Refills: 12 | Status: ACTIVE | OUTPATIENT
Start: 2025-06-27